# Patient Record
Sex: FEMALE | Race: WHITE | Employment: UNEMPLOYED
[De-identification: names, ages, dates, MRNs, and addresses within clinical notes are randomized per-mention and may not be internally consistent; named-entity substitution may affect disease eponyms.]

---

## 2017-05-27 ENCOUNTER — HEALTH MAINTENANCE LETTER (OUTPATIENT)
Age: 27
End: 2017-05-27

## 2019-03-04 ENCOUNTER — TRANSFERRED RECORDS (OUTPATIENT)
Dept: HEALTH INFORMATION MANAGEMENT | Facility: CLINIC | Age: 29
End: 2019-03-04
Payer: COMMERCIAL

## 2019-03-04 LAB — PAP SMEAR - HIM PATIENT REPORTED: NEGATIVE

## 2019-05-19 ENCOUNTER — HOSPITAL ENCOUNTER (EMERGENCY)
Facility: CLINIC | Age: 29
Discharge: HOME | End: 2019-05-19
Attending: FAMILY MEDICINE | Admitting: FAMILY MEDICINE
Payer: COMMERCIAL

## 2019-05-19 VITALS
DIASTOLIC BLOOD PRESSURE: 87 MMHG | HEART RATE: 73 BPM | TEMPERATURE: 97.8 F | RESPIRATION RATE: 19 BRPM | OXYGEN SATURATION: 97 % | SYSTOLIC BLOOD PRESSURE: 124 MMHG

## 2019-05-19 DIAGNOSIS — R10.9 CENTRAL ABDOMINAL PAIN: ICD-10-CM

## 2019-05-19 LAB
ALBUMIN UR-MCNC: NEGATIVE MG/DL
ANION GAP SERPL CALCULATED.3IONS-SCNC: 6 MMOL/L (ref 3–14)
APPEARANCE UR: CLEAR
BASOPHILS # BLD AUTO: 0.1 10E9/L (ref 0–0.2)
BASOPHILS NFR BLD AUTO: 0.7 %
BILIRUB UR QL STRIP: NEGATIVE
BUN SERPL-MCNC: 11 MG/DL (ref 7–30)
CALCIUM SERPL-MCNC: 8.7 MG/DL (ref 8.5–10.1)
CHLORIDE SERPL-SCNC: 106 MMOL/L (ref 94–109)
CO2 SERPL-SCNC: 29 MMOL/L (ref 20–32)
COLOR UR AUTO: NORMAL
CREAT SERPL-MCNC: 0.6 MG/DL (ref 0.52–1.04)
DIFFERENTIAL METHOD BLD: NORMAL
EOSINOPHIL # BLD AUTO: 0.5 10E9/L (ref 0–0.7)
EOSINOPHIL NFR BLD AUTO: 6 %
ERYTHROCYTE [DISTWIDTH] IN BLOOD BY AUTOMATED COUNT: 12.1 % (ref 10–15)
GFR SERPL CREATININE-BSD FRML MDRD: >90 ML/MIN/{1.73_M2}
GLUCOSE SERPL-MCNC: 87 MG/DL (ref 70–99)
GLUCOSE UR STRIP-MCNC: NEGATIVE MG/DL
HCG UR QL: NEGATIVE
HCT VFR BLD AUTO: 40.5 % (ref 35–47)
HGB BLD-MCNC: 14.1 G/DL (ref 11.7–15.7)
HGB UR QL STRIP: NEGATIVE
IMM GRANULOCYTES # BLD: 0 10E9/L (ref 0–0.4)
IMM GRANULOCYTES NFR BLD: 0.2 %
INTERNAL QC OK POCT: YES
KETONES UR STRIP-MCNC: NEGATIVE MG/DL
LEUKOCYTE ESTERASE UR QL STRIP: NEGATIVE
LYMPHOCYTES # BLD AUTO: 3 10E9/L (ref 0.8–5.3)
LYMPHOCYTES NFR BLD AUTO: 35.1 %
MCH RBC QN AUTO: 31 PG (ref 26.5–33)
MCHC RBC AUTO-ENTMCNC: 34.8 G/DL (ref 31.5–36.5)
MCV RBC AUTO: 89 FL (ref 78–100)
MONOCYTES # BLD AUTO: 0.5 10E9/L (ref 0–1.3)
MONOCYTES NFR BLD AUTO: 5.8 %
NEUTROPHILS # BLD AUTO: 4.5 10E9/L (ref 1.6–8.3)
NEUTROPHILS NFR BLD AUTO: 52.2 %
NITRATE UR QL: NEGATIVE
NRBC # BLD AUTO: 0 10*3/UL
NRBC BLD AUTO-RTO: 0 /100
PH UR STRIP: 6.5 PH (ref 5–7)
PLATELET # BLD AUTO: 239 10E9/L (ref 150–450)
POTASSIUM SERPL-SCNC: 3.7 MMOL/L (ref 3.4–5.3)
RBC # BLD AUTO: 4.55 10E12/L (ref 3.8–5.2)
RBC #/AREA URNS AUTO: 0 /HPF (ref 0–2)
SODIUM SERPL-SCNC: 141 MMOL/L (ref 133–144)
SOURCE: NORMAL
SP GR UR STRIP: 1.01 (ref 1–1.03)
SQUAMOUS #/AREA URNS AUTO: <1 /HPF (ref 0–1)
UROBILINOGEN UR STRIP-MCNC: NORMAL MG/DL (ref 0–2)
WBC # BLD AUTO: 8.6 10E9/L (ref 4–11)
WBC #/AREA URNS AUTO: <1 /HPF (ref 0–5)

## 2019-05-19 PROCEDURE — 80048 BASIC METABOLIC PNL TOTAL CA: CPT | Performed by: FAMILY MEDICINE

## 2019-05-19 PROCEDURE — 81001 URINALYSIS AUTO W/SCOPE: CPT | Performed by: FAMILY MEDICINE

## 2019-05-19 PROCEDURE — 81025 URINE PREGNANCY TEST: CPT | Performed by: FAMILY MEDICINE

## 2019-05-19 PROCEDURE — 85025 COMPLETE CBC W/AUTO DIFF WBC: CPT | Performed by: FAMILY MEDICINE

## 2019-05-19 PROCEDURE — 99284 EMERGENCY DEPT VISIT MOD MDM: CPT | Performed by: FAMILY MEDICINE

## 2019-05-19 PROCEDURE — 99283 EMERGENCY DEPT VISIT LOW MDM: CPT | Mod: Z6 | Performed by: FAMILY MEDICINE

## 2019-05-19 ASSESSMENT — ENCOUNTER SYMPTOMS
APPETITE CHANGE: 0
CONSTIPATION: 0
CHILLS: 0
DIARRHEA: 0
ABDOMINAL PAIN: 1
FEVER: 0
VOMITING: 0
NAUSEA: 0
MYALGIAS: 0
HEMATOLOGIC/LYMPHATIC NEGATIVE: 1
FATIGUE: 0
FREQUENCY: 0
DYSURIA: 0
FLANK PAIN: 0
SORE THROAT: 0

## 2019-05-19 NOTE — ED AVS SNAPSHOT
[facility], [place], Emergency Department  [address]  [facility] [address]  Phone:  [phone]  Fax:  [phone]                                    Lynnette Vicente   MRN: [id number]    Department:  [facility], Emergency Department   Date of Visit:  5/19/2019           After Visit Summary Signature Page    I have received my discharge instructions, and my questions have been answered. I have discussed any challenges I see with this plan with the nurse or doctor.    ..........................................................................................................................................  Patient/Patient Representative Signature      ..........................................................................................................................................  Patient Representative Print Name and Relationship to Patient    ..................................................               ................................................  Date                                   Time    ..........................................................................................................................................  Reviewed by Signature/Title    ...................................................              ..............................................  Date                                               Time          22EPIC Rev 08/18

## 2019-05-20 NOTE — ED PROVIDER NOTES
History     Chief Complaint   Patient presents with     Abdominal Pain     Sharp right lower quadrant pain started this morning. No nausea, or vomiting.     HPI  Lynnette Vicente is a 28 year old female who has NO medical problems other than migranes and is on no medications. She presents with 12 hours of mild periumbilical pain- more right than left. There is no fevers or chills. No nausea or vomiting. No dysuria or frequency. No hematuria. No constipation or diarrhea. She is active sexually with a mirena IUD. No bleeding or discharge. No trauma. The pt has eaten today and is mildy hungry presently    I have reviewed the Medications, Allergies, Past Medical and Surgical History, and Social History in the Epic system.  No street drugs or tobacco or significant alcohol  Review of Systems   Constitutional: Negative for appetite change, chills, fatigue and fever.   HENT: Negative for congestion and sore throat.    Cardiovascular: Negative for chest pain.   Gastrointestinal: Positive for abdominal pain. Negative for constipation, diarrhea, nausea and vomiting.   Genitourinary: Negative for dysuria, flank pain, frequency, pelvic pain, vaginal bleeding and vaginal discharge.   Musculoskeletal: Negative for myalgias.   Allergic/Immunologic: Negative for immunocompromised state.   Hematological: Negative.        Physical Exam   BP: 124/78  Pulse: 73  Heart Rate: 57  Temp: 97.9  F (36.6  C)  Resp: 17  SpO2: 97 %      Physical Exam   Constitutional: She is oriented to person, place, and time. She appears well-developed and well-nourished. No distress.   HENT:   Head: Normocephalic and atraumatic.   Neck: Normal range of motion. Neck supple.   Cardiovascular: Normal rate and regular rhythm.   Pulmonary/Chest: Effort normal.   Abdominal: Soft. She exhibits no mass. There is tenderness. There is no rebound and no guarding.   Mild tenderness periumbilical, R>L periumbilically  No guarding or rebound  Benign exam at this time  No  hs indira   Musculoskeletal: She exhibits no edema.   Neurological: She is alert and oriented to person, place, and time.   Skin: Skin is warm and dry. She is not diaphoretic.   Nursing note and vitals reviewed.      ED Course        Procedures        UPT neg  ua normal- sp gr 1.008 with no ketones  Cbc normal  Bmp normal    Labs Ordered and Resulted from Time of ED Arrival Up to the Time of Departure from the ED   HCG QUAL URINE POCT - Normal   ROUTINE UA WITH MICROSCOPIC REFLEX TO CULTURE   CBC WITH PLATELETS DIFFERENTIAL   BASIC METABOLIC PANEL            Assessments & Plan (with Medical Decision Making)   The pt has mild periumbilical pain/tenderness for 12 hour with normal labs. She has no other symptoms  The pt and I with boyfriend had long discussion. This could be an early appy.   We discussed CT scan and radiation. She would like to avoid radiation as she has had one previous ct of the brain.  After discussion discussing options - she wants to go home and return for exam if fully better  If she returns she should get a ct scan. She reports that when she had the brain scan(for headaches)- she got contrast and developed mild case of hives.   If she returns she should get the ct without contrast- I spoke with radiologist    I have reviewed the nursing notes.    I have reviewed the findings, diagnosis, plan and need for follow up with the patient.       Medication List      There are no discharge medications for this visit.         Final diagnoses:   Central abdominal pain       5/19/2019   [facility] [place], EMERGENCY DEPARTMENT     Bradford Aguayo MD  05/19/19 5810

## 2022-01-25 ENCOUNTER — OFFICE VISIT (OUTPATIENT)
Dept: FAMILY MEDICINE | Facility: CLINIC | Age: 32
End: 2022-01-25
Payer: COMMERCIAL

## 2022-01-25 VITALS
TEMPERATURE: 97.9 F | OXYGEN SATURATION: 98 % | WEIGHT: 137.3 LBS | HEART RATE: 68 BPM | DIASTOLIC BLOOD PRESSURE: 73 MMHG | HEIGHT: 63 IN | SYSTOLIC BLOOD PRESSURE: 114 MMHG | BODY MASS INDEX: 24.33 KG/M2

## 2022-01-25 DIAGNOSIS — M79.604 PAIN IN BOTH LOWER EXTREMITIES: Primary | ICD-10-CM

## 2022-01-25 DIAGNOSIS — M79.605 PAIN IN BOTH LOWER EXTREMITIES: Primary | ICD-10-CM

## 2022-01-25 LAB
ERYTHROCYTE [DISTWIDTH] IN BLOOD BY AUTOMATED COUNT: 12.5 % (ref 10–15)
ERYTHROCYTE [SEDIMENTATION RATE] IN BLOOD BY WESTERGREN METHOD: 4 MM/HR (ref 0–20)
HCT VFR BLD AUTO: 40.8 % (ref 35–47)
HGB BLD-MCNC: 14.4 G/DL (ref 11.7–15.7)
MCH RBC QN AUTO: 30.6 PG (ref 26.5–33)
MCHC RBC AUTO-ENTMCNC: 35.3 G/DL (ref 31.5–36.5)
MCV RBC AUTO: 87 FL (ref 78–100)
PLATELET # BLD AUTO: 257 10E3/UL (ref 150–450)
RBC # BLD AUTO: 4.7 10E6/UL (ref 3.8–5.2)
WBC # BLD AUTO: 8.9 10E3/UL (ref 4–11)

## 2022-01-25 PROCEDURE — 86140 C-REACTIVE PROTEIN: CPT | Performed by: FAMILY MEDICINE

## 2022-01-25 PROCEDURE — 80053 COMPREHEN METABOLIC PANEL: CPT | Performed by: FAMILY MEDICINE

## 2022-01-25 PROCEDURE — 84443 ASSAY THYROID STIM HORMONE: CPT | Performed by: FAMILY MEDICINE

## 2022-01-25 PROCEDURE — 36415 COLL VENOUS BLD VENIPUNCTURE: CPT | Performed by: FAMILY MEDICINE

## 2022-01-25 PROCEDURE — 85652 RBC SED RATE AUTOMATED: CPT | Performed by: FAMILY MEDICINE

## 2022-01-25 PROCEDURE — 85027 COMPLETE CBC AUTOMATED: CPT | Performed by: FAMILY MEDICINE

## 2022-01-25 PROCEDURE — 99203 OFFICE O/P NEW LOW 30 MIN: CPT | Performed by: FAMILY MEDICINE

## 2022-01-25 PROCEDURE — 82607 VITAMIN B-12: CPT | Performed by: FAMILY MEDICINE

## 2022-01-25 ASSESSMENT — MIFFLIN-ST. JEOR: SCORE: 1296.12

## 2022-01-25 NOTE — PROGRESS NOTES
Assessment & Plan     Pain in both lower extremities  B/l leg pains - intermittent stabbing pain that is happening more frequently   No weakness or other   Wonder about a neuropathy?  Will check some labs to look for possible cause such as hypothyroid, anemia, b12 def or diabetes.  Labs pending  Would like to have her follow-up in 2-3 weeks for reassessment as not etiology found and increasing in frequency.  - TSH with free T4 reflex; Future  - CBC with platelets; Future  - Comprehensive metabolic panel (BMP + Alb, Alk Phos, ALT, AST, Total. Bili, TP); Future  - Vitamin B12; Future  - ESR: Erythrocyte sedimentation rate; Future  - CRP, inflammation; Future            No follow-ups on file.    Annalisa IFRAH Beck [facility] is a 31 year old who presents for the following health issues     History of Present Illness       She eats 4 or more servings of fruits and vegetables daily.She consumes 0 sweetened beverage(s) daily.She exercises with enough effort to increase her heart rate 10 to 19 minutes per day.  She exercises with enough effort to increase her heart rate 3 or less days per week.   She is taking medications regularly.       Pain History:  When did you first notice your pain? - Less than 1 week   Have you seen anyone else for your pain? No  Where in your body do you have pain? Musculoskeletal problem/pain  Onset/Duration: Few weeks  Description  Location: leg - Both  Joint Swelling: no  Redness: no  Pain: YES  Warmth: no  Intensity:  moderate  Progression of Symptoms:  worsening  Accompanying signs and symptoms:   Fevers: no  Numbness/tingling/weakness: YES  History  Trauma to the area: no  Recent illness:  no  Previous similar problem: no  Previous evaluation:  no  Precipitating or alleviating factors:  Aggravating factors include: sitting and standing  Therapies tried and outcome: rest/inactivity, heat, ice and stretching    First noticed symptoms few weeks ago  "  At onset was every few ays but has progressed to multiple times a dday  The pain will last a few seconds   Associated with increase sensitivity  Will happen if standing, sitting or lying down  No skin changes or rashes  Yesterday felt tired    No swelling n legs   No rash      Only change is working from home one extra day  Also started [place] skiing and shoveling but not clearly related    Family history -   CAD and diabetes  PGM and Paunt have RA    Hx of migraines    Has IUD in place - Mirena   Was placed in 2015-16          Review of Systems   Constitutional, HEENT, cardiovascular, pulmonary, GI, , musculoskeletal, neuro, skin, endocrine and psych systems are negative, except as otherwise noted.      Objective    /73   Pulse 68   Temp 97.9  F (36.6  C)   Ht 1.599 m (5' 2.95\")   Wt 62.3 kg (137 lb 4.8 oz)   SpO2 98%   BMI 24.36 kg/m    Body mass index is 24.36 kg/m .  Physical Exam   GENERAL: healthy, alert and no distress  MS: no gross musculoskeletal defects noted, no edema  SKIN: no suspicious lesions or rashes  NEURO: Normal strength and tone, sensory exam grossly normal, mentation intact and DTR's normal and symmetric    Comprehensive back pain exam:  Tenderness over the thoracic mid back but no reproducible pain, Lower extremity strength functional and equal on both sides, Lower extremity reflexes within normal limits bilaterally and Lower extremity sensation normal and equal on both sides                "

## 2022-01-26 LAB
CRP SERPL-MCNC: <2.9 MG/L (ref 0–8)
VIT B12 SERPL-MCNC: 484 PG/ML (ref 193–986)

## 2022-01-27 LAB
ALBUMIN SERPL-MCNC: 4.3 G/DL (ref 3.4–5)
ALP SERPL-CCNC: 52 U/L (ref 40–150)
ALT SERPL W P-5'-P-CCNC: 22 U/L (ref 0–50)
ANION GAP SERPL CALCULATED.3IONS-SCNC: 9 MMOL/L (ref 3–14)
AST SERPL W P-5'-P-CCNC: 15 U/L (ref 0–45)
BILIRUB SERPL-MCNC: 0.5 MG/DL (ref 0.2–1.3)
BUN SERPL-MCNC: 9 MG/DL (ref 7–30)
CALCIUM SERPL-MCNC: 9 MG/DL (ref 8.5–10.1)
CHLORIDE BLD-SCNC: 105 MMOL/L (ref 94–109)
CO2 SERPL-SCNC: 24 MMOL/L (ref 20–32)
CREAT SERPL-MCNC: 0.63 MG/DL (ref 0.52–1.04)
GFR SERPL CREATININE-BSD FRML MDRD: >90 ML/MIN/1.73M2
GLUCOSE BLD-MCNC: 79 MG/DL (ref 70–99)
POTASSIUM BLD-SCNC: 4.6 MMOL/L (ref 3.4–5.3)
PROT SERPL-MCNC: 7.2 G/DL (ref 6.8–8.8)
SODIUM SERPL-SCNC: 138 MMOL/L (ref 133–144)
TSH SERPL DL<=0.005 MIU/L-ACNC: 2.14 MU/L (ref 0.4–4)

## 2022-01-28 NOTE — RESULT ENCOUNTER NOTE
Dear Lynnette,   Your test results are all back -   -All of your labs are normal.  Let us know if you have any questions.  -Annalisa Beck, DO

## 2022-02-09 ENCOUNTER — VIRTUAL VISIT (OUTPATIENT)
Dept: FAMILY MEDICINE | Facility: CLINIC | Age: 32
End: 2022-02-09
Payer: COMMERCIAL

## 2022-02-09 DIAGNOSIS — M79.604 PAIN IN BOTH LOWER EXTREMITIES: Primary | ICD-10-CM

## 2022-02-09 DIAGNOSIS — G43.009 MIGRAINE WITHOUT AURA AND WITHOUT STATUS MIGRAINOSUS, NOT INTRACTABLE: ICD-10-CM

## 2022-02-09 DIAGNOSIS — M79.605 PAIN IN BOTH LOWER EXTREMITIES: Primary | ICD-10-CM

## 2022-02-09 PROCEDURE — 99213 OFFICE O/P EST LOW 20 MIN: CPT | Mod: 95 | Performed by: FAMILY MEDICINE

## 2022-02-09 RX ORDER — MULTIPLE VITAMINS W/ MINERALS TAB 9MG-400MCG
1 TAB ORAL DAILY
COMMUNITY
End: 2023-04-28

## 2022-02-09 NOTE — PROGRESS NOTES
Lynnette is a 31 year old who is being evaluated via a billable video visit.      How would you like to obtain your AVS? MyChart  If the video visit is dropped, the invitation should be resent by: Text to cell phone: [phone]  Will anyone else be joining your video visit? No     Video Start Time: 12:40pm    Assessment & Plan     Pain in both lower extremities  Pt with strange sensation in b/l legs that is still going on but slightly improved  Wonder about neuromuscular vs other possible etiology  Labs were normal  Will refer to neurology for evaluation   - Adult Neurology  Referral; Future    Migraine without aura and without status migrainosus, not intractable  Hx of migraines and tension headaches  Would like to discuss with neurology as well   - Adult Neurology  Referral; Future      No follow-ups on file.    Annalisa Beck, [facility]   Lynnette is a 31 year old who presents for the following health issues     HPI     Pain History:  When did you first notice your pain? - Less than 1 week   Have you seen anyone else for your pain? Yes - Dr. Beck   Where in your body do you have pain? Musculoskeletal problem/pain  Onset/Duration: a few weeks ago   Description  Location: leg - bilateral  Joint Swelling: no  Redness: no  Pain: YES  Warmth: no  Intensity:  moderate  Progression of Symptoms:  same  Accompanying signs and symptoms:   Fevers: no  Numbness/tingling/weakness: no  History  Trauma to the area: no  Recent illness:  no  Previous similar problem: no  Previous evaluation:  YES  Precipitating or alleviating factors:  Aggravating factors include: sitting and standing  Therapies tried and outcome: rest/inactivity, heat, ice, massage and stretching    Still having strange pain in legs -   Will notice in inner thighs and lower calves - feels like someone is pushing or digging pressure into the muscle in the area.  Also noticed skin had increase sensitivity    Previously had multiple episodes a day   Still having episodes but less frequent and less severe  Currently 1-3 times a day but before was more every hours  Started taking vitamin after her last visit -   No weakness in the legs, no coordination problems  Hx of migraines -   But will also get bursts of pain out of her eye   No new headaches                 Review of Systems   Constitutional, HEENT, cardiovascular, pulmonary, gi and gu systems are negative, except as otherwise noted.      Objective           Vitals:  No vitals were obtained today due to virtual visit.    Physical Exam   GENERAL: Healthy, alert and no distress  EYES: Eyes grossly normal to inspection.  No discharge or erythema, or obvious scleral/conjunctival abnormalities.  RESP: No audible wheeze, cough, or visible cyanosis.  No visible retractions or increased work of breathing.    SKIN: Visible skin clear. No significant rash, abnormal pigmentation or lesions.  NEURO: Cranial nerves grossly intact.  Mentation and speech appropriate for age.  PSYCH: Mentation appears normal, affect normal/bright, judgement and insight intact, normal speech and appearance well-groomed.                Video-Visit Details    Type of service:  Video Visit    Video End Time:1:09 PM    Originating Location (pt. Location): Home    Distant Location (provider location):  [facility]     Platform used for Video Visit: EQUISO

## 2022-02-12 ENCOUNTER — HEALTH MAINTENANCE LETTER (OUTPATIENT)
Age: 32
End: 2022-02-12
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
## 2022-06-03 ENCOUNTER — OFFICE VISIT (OUTPATIENT)
Dept: FAMILY MEDICINE | Facility: CLINIC | Age: 32
End: 2022-06-03
Payer: COMMERCIAL

## 2022-06-03 VITALS
RESPIRATION RATE: 15 BRPM | OXYGEN SATURATION: 97 % | TEMPERATURE: 98.6 F | WEIGHT: 135.5 LBS | BODY MASS INDEX: 24.01 KG/M2 | HEART RATE: 77 BPM | HEIGHT: 63 IN | SYSTOLIC BLOOD PRESSURE: 119 MMHG | DIASTOLIC BLOOD PRESSURE: 71 MMHG

## 2022-06-03 DIAGNOSIS — Z11.59 NEED FOR HEPATITIS C SCREENING TEST: ICD-10-CM

## 2022-06-03 DIAGNOSIS — Z11.4 SCREENING FOR HIV (HUMAN IMMUNODEFICIENCY VIRUS): ICD-10-CM

## 2022-06-03 DIAGNOSIS — Z97.5 IUD (INTRAUTERINE DEVICE) IN PLACE: ICD-10-CM

## 2022-06-03 DIAGNOSIS — M24.20 LIGAMENT LAXITY: ICD-10-CM

## 2022-06-03 DIAGNOSIS — Z00.00 ROUTINE GENERAL MEDICAL EXAMINATION AT A HEALTH CARE FACILITY: Primary | ICD-10-CM

## 2022-06-03 LAB
CHOLEST SERPL-MCNC: 198 MG/DL
FASTING STATUS PATIENT QL REPORTED: YES
HDLC SERPL-MCNC: 53 MG/DL
LDLC SERPL CALC-MCNC: 130 MG/DL
NONHDLC SERPL-MCNC: 145 MG/DL
TRIGL SERPL-MCNC: 77 MG/DL

## 2022-06-03 PROCEDURE — 36415 COLL VENOUS BLD VENIPUNCTURE: CPT | Performed by: FAMILY MEDICINE

## 2022-06-03 PROCEDURE — 90471 IMMUNIZATION ADMIN: CPT | Performed by: FAMILY MEDICINE

## 2022-06-03 PROCEDURE — 90715 TDAP VACCINE 7 YRS/> IM: CPT | Performed by: FAMILY MEDICINE

## 2022-06-03 PROCEDURE — 80061 LIPID PANEL: CPT | Performed by: FAMILY MEDICINE

## 2022-06-03 PROCEDURE — 99395 PREV VISIT EST AGE 18-39: CPT | Mod: 25 | Performed by: FAMILY MEDICINE

## 2022-06-03 ASSESSMENT — ENCOUNTER SYMPTOMS
HEADACHES: 1
DIZZINESS: 0
DIARRHEA: 0
FEVER: 0
DYSURIA: 0
CHILLS: 0
SORE THROAT: 0
PARESTHESIAS: 1
HEMATOCHEZIA: 0
MYALGIAS: 0
ARTHRALGIAS: 1
NERVOUS/ANXIOUS: 1
NAUSEA: 0
PALPITATIONS: 0
HEMATURIA: 0
CONSTIPATION: 1
HEARTBURN: 0
FREQUENCY: 0
SHORTNESS OF BREATH: 0
WEAKNESS: 0
BREAST MASS: 0
EYE PAIN: 0
JOINT SWELLING: 0
ABDOMINAL PAIN: 0
COUGH: 0

## 2022-06-05 NOTE — RESULT ENCOUNTER NOTE
Dear Lynnette,   Your test results are all back -   -Cholesterol levels (LDL,HDL, Triglycerides) are normal.  LDL is just borderline so always good to keep eating healthy and exercise.  ADVISE: rechecking in 1 year.   Let us know if you have any questions.  -Annalisa Beck, DO

## 2022-07-29 NOTE — TELEPHONE ENCOUNTER
FUTURE VISIT INFORMATION      FUTURE VISIT INFORMATION:    Date: 9/2/2022    Time: 7am    [facility]  REFERRAL INFORMATION:    Referring provider:  Dr. Beck    Referring providers clinic:  [facility]     Reason for visit/diagnosis  Migraines, Pain     RECORDS REQUESTED FROM:       Clinic name Comments Records Status Imaging Status   Internal Dr. Beck-2/9/2022, 6/3/2022 Epic  No Images          [facility] Dr. Collins-8/16/2019 Care Everywhere No Images

## 2022-09-02 ENCOUNTER — PRE VISIT (OUTPATIENT)
Dept: NEUROLOGY | Facility: CLINIC | Age: 32
End: 2022-09-02

## 2022-10-10 ENCOUNTER — HEALTH MAINTENANCE LETTER (OUTPATIENT)
Age: 32
End: 2022-10-10

## 2023-01-11 ENCOUNTER — TELEPHONE (OUTPATIENT)
Dept: FAMILY MEDICINE | Facility: CLINIC | Age: 33
End: 2023-01-11

## 2023-01-11 ENCOUNTER — NURSE TRIAGE (OUTPATIENT)
Dept: FAMILY MEDICINE | Facility: CLINIC | Age: 33
End: 2023-01-11

## 2023-01-11 NOTE — TELEPHONE ENCOUNTER
Nurse Triage SBAR    Is this a 2nd Level Triage? NO    Situation: Patient called  Experiencing bilateral eye pain, worse on L side  For the past 3 months    Background: Has history of migraines  Takes ibuprofen to help    Assessment: Denies changes to vision  Consulted PCP, agrees with recommendation    Protocol Recommended Disposition:   See Within 2 Weeks In Office    Recommendation: Scheduled at eye doctor Tuesday   Neurology appt upcoming  PCP scheduled 1/24/23  Will call back or go to ER if symptoms worsen     Does the patient meet one of the following criteria for ADS visit consideration? No    Reason for Disposition    Mild eye pains are a recurrent problem    Additional Information    Negative: Followed an eye injury    Negative: Eye pain from chemical in the eye    Negative: Eye pain from foreign body in eye    Negative: Has sinus pain or pressure    Negative: Severe eye pain    Negative: Complete loss of vision in one or both eyes    Negative: Eyelids are very swollen (shut or almost) and fever    Negative: Eyelid (outer) is very red and fever    Negative: Foreign body sensation ('feels like something is in there') and irrigation didn't help    Negative: Vomiting    Negative: Ulcer or sore seen on the cornea (clear center part of the eye)    Negative: Recent eye surgery and increasing eye pain    Negative: Blurred vision and new or worsening    Negative: Patient sounds very sick or weak to the triager    Negative: Eye pain/discomfort and more than mild    Negative: Eyelids are very swollen (shut or almost) and no fever    Negative: Painful rash near eye and multiple small blisters grouped together    Negative: Pain followed bright light exposure from welding    Negative: Looking at light causes severe pain (i.e., photophobia)    Negative: Yellow or green pus occurs    Negative: Eye pain present > 24 hours    Negative: Patient wants to be seen    Negative: Mild eyelid pain is a recurrent problem and red and  crusty eyelids    Protocols used: EYE PAIN-A-[place]

## 2023-01-11 NOTE — TELEPHONE ENCOUNTER
Reason for Call:  Other appointment    Detailed comments: Patient has an appointment on January 24 as virtual appointment  with Doctor. Annalisa Beck but she wants to know if she can go in person for the appointment. Please call back.     Phone Number Patient can be reached at: Home number on file [phone]    Best Time: Any time    Can we leave a detailed message on this number? YES    Call taken on 1/11/2023 at 3:16 PM by Yaneli Marx

## 2023-01-17 ENCOUNTER — TRANSFERRED RECORDS (OUTPATIENT)
Dept: HEALTH INFORMATION MANAGEMENT | Facility: CLINIC | Age: 33
End: 2023-01-17
Payer: COMMERCIAL

## 2023-01-24 ENCOUNTER — OFFICE VISIT (OUTPATIENT)
Dept: FAMILY MEDICINE | Facility: CLINIC | Age: 33
End: 2023-01-24
Payer: COMMERCIAL

## 2023-01-24 VITALS
BODY MASS INDEX: 25.08 KG/M2 | DIASTOLIC BLOOD PRESSURE: 68 MMHG | TEMPERATURE: 97.8 F | SYSTOLIC BLOOD PRESSURE: 115 MMHG | WEIGHT: 136.3 LBS | HEIGHT: 62 IN | HEART RATE: 66 BPM | RESPIRATION RATE: 14 BRPM | OXYGEN SATURATION: 99 %

## 2023-01-24 DIAGNOSIS — F43.21 GRIEF REACTION: Primary | ICD-10-CM

## 2023-01-24 DIAGNOSIS — F32.0 CURRENT MILD EPISODE OF MAJOR DEPRESSIVE DISORDER WITHOUT PRIOR EPISODE (H): ICD-10-CM

## 2023-01-24 LAB
ALBUMIN SERPL BCG-MCNC: 4.8 G/DL (ref 3.5–5.2)
ALP SERPL-CCNC: 55 U/L (ref 35–104)
ALT SERPL W P-5'-P-CCNC: 12 U/L (ref 10–35)
ANION GAP SERPL CALCULATED.3IONS-SCNC: 13 MMOL/L (ref 7–15)
AST SERPL W P-5'-P-CCNC: 25 U/L (ref 10–35)
BILIRUB SERPL-MCNC: 0.4 MG/DL
BUN SERPL-MCNC: 13.4 MG/DL (ref 6–20)
CALCIUM SERPL-MCNC: 9.8 MG/DL (ref 8.6–10)
CHLORIDE SERPL-SCNC: 104 MMOL/L (ref 98–107)
CREAT SERPL-MCNC: 0.6 MG/DL (ref 0.51–0.95)
DEPRECATED HCO3 PLAS-SCNC: 23 MMOL/L (ref 22–29)
ERYTHROCYTE [DISTWIDTH] IN BLOOD BY AUTOMATED COUNT: 12.3 % (ref 10–15)
GFR SERPL CREATININE-BSD FRML MDRD: >90 ML/MIN/1.73M2
GLUCOSE SERPL-MCNC: 106 MG/DL (ref 70–99)
HCT VFR BLD AUTO: 40.9 % (ref 35–47)
HGB BLD-MCNC: 14.3 G/DL (ref 11.7–15.7)
MCH RBC QN AUTO: 30.7 PG (ref 26.5–33)
MCHC RBC AUTO-ENTMCNC: 35 G/DL (ref 31.5–36.5)
MCV RBC AUTO: 88 FL (ref 78–100)
PLATELET # BLD AUTO: 262 10E3/UL (ref 150–450)
POTASSIUM SERPL-SCNC: 4.2 MMOL/L (ref 3.4–5.3)
PROT SERPL-MCNC: 7.6 G/DL (ref 6.4–8.3)
RBC # BLD AUTO: 4.66 10E6/UL (ref 3.8–5.2)
SODIUM SERPL-SCNC: 140 MMOL/L (ref 136–145)
TSH SERPL DL<=0.005 MIU/L-ACNC: 2.28 UIU/ML (ref 0.3–4.2)
VIT B12 SERPL-MCNC: 685 PG/ML (ref 232–1245)
WBC # BLD AUTO: 9.3 10E3/UL (ref 4–11)

## 2023-01-24 PROCEDURE — 80053 COMPREHEN METABOLIC PANEL: CPT | Performed by: FAMILY MEDICINE

## 2023-01-24 PROCEDURE — 85027 COMPLETE CBC AUTOMATED: CPT | Performed by: FAMILY MEDICINE

## 2023-01-24 PROCEDURE — 84443 ASSAY THYROID STIM HORMONE: CPT | Performed by: FAMILY MEDICINE

## 2023-01-24 PROCEDURE — 99214 OFFICE O/P EST MOD 30 MIN: CPT | Performed by: FAMILY MEDICINE

## 2023-01-24 PROCEDURE — 36415 COLL VENOUS BLD VENIPUNCTURE: CPT | Performed by: FAMILY MEDICINE

## 2023-01-24 PROCEDURE — 82607 VITAMIN B-12: CPT | Performed by: FAMILY MEDICINE

## 2023-01-24 RX ORDER — ESCITALOPRAM OXALATE 5 MG/1
TABLET ORAL
Qty: 30 TABLET | Refills: 1 | Status: SHIPPED | OUTPATIENT
Start: 2023-01-24 | End: 2023-04-04

## 2023-01-24 ASSESSMENT — PATIENT HEALTH QUESTIONNAIRE - PHQ9
SUM OF ALL RESPONSES TO PHQ QUESTIONS 1-9: 17
5. POOR APPETITE OR OVEREATING: MORE THAN HALF THE DAYS
SUM OF ALL RESPONSES TO PHQ QUESTIONS 1-9: 17
10. IF YOU CHECKED OFF ANY PROBLEMS, HOW DIFFICULT HAVE THESE PROBLEMS MADE IT FOR YOU TO DO YOUR WORK, TAKE CARE OF THINGS AT HOME, OR GET ALONG WITH OTHER PEOPLE: VERY DIFFICULT

## 2023-01-24 ASSESSMENT — ANXIETY QUESTIONNAIRES
5. BEING SO RESTLESS THAT IT IS HARD TO SIT STILL: SEVERAL DAYS
GAD7 TOTAL SCORE: 15
IF YOU CHECKED OFF ANY PROBLEMS ON THIS QUESTIONNAIRE, HOW DIFFICULT HAVE THESE PROBLEMS MADE IT FOR YOU TO DO YOUR WORK, TAKE CARE OF THINGS AT HOME, OR GET ALONG WITH OTHER PEOPLE: VERY DIFFICULT
2. NOT BEING ABLE TO STOP OR CONTROL WORRYING: NEARLY EVERY DAY
GAD7 TOTAL SCORE: 15
7. FEELING AFRAID AS IF SOMETHING AWFUL MIGHT HAPPEN: MORE THAN HALF THE DAYS
3. WORRYING TOO MUCH ABOUT DIFFERENT THINGS: NEARLY EVERY DAY
6. BECOMING EASILY ANNOYED OR IRRITABLE: SEVERAL DAYS
1. FEELING NERVOUS, ANXIOUS, OR ON EDGE: NEARLY EVERY DAY

## 2023-01-24 ASSESSMENT — PAIN SCALES - GENERAL: PAINLEVEL: NO PAIN (0)

## 2023-01-24 NOTE — PROGRESS NOTES
"  Assessment & Plan     Grief reaction  See below  - Comprehensive metabolic panel (BMP + Alb, Alk Phos, ALT, AST, Total. Bili, TP); Future  - CBC with platelets; Future  - Vitamin B12; Future  - TSH with free T4 reflex; Future  - escitalopram (LEXAPRO) 5 MG tablet; Take 1/2 tablet for 1 week then increase to 1 tablet daily  - Comprehensive metabolic panel (BMP + Alb, Alk Phos, ALT, AST, Total. Bili, TP)  - CBC with platelets  - Vitamin B12  - TSH with free T4 reflex    Current mild episode of major depressive disorder without prior episode (H)  Worsening depression after grief with mother and friend passing away this past year  Working with therapist and self cares   Feels worse until this week  Discussed medications - options and potential side effects  Will start very low dose lexapro 5mg -   F/u 4-6 weeks   Pt will call or RTC if symptoms worsen or do not improve.   - Comprehensive metabolic panel (BMP + Alb, Alk Phos, ALT, AST, Total. Bili, TP); Future  - CBC with platelets; Future  - Vitamin B12; Future  - TSH with free T4 reflex; Future  - escitalopram (LEXAPRO) 5 MG tablet; Take 1/2 tablet for 1 week then increase to 1 tablet daily  - Comprehensive metabolic panel (BMP + Alb, Alk Phos, ALT, AST, Total. Bili, TP)  - CBC with platelets  - Vitamin B12  - TSH with free T4 reflex             BMI:   Estimated body mass index is 25.02 kg/m  as calculated from the following:    Height as of this encounter: 1.572 m (5' 1.89\").    Weight as of this encounter: 61.8 kg (136 lb 4.8 oz).       Depression Screening Follow Up    PHQ 1/24/2023   PHQ-9 Total Score 17   Q9: Thoughts of better off dead/self-harm past 2 weeks Several days   F/U: Thoughts of suicide or self-harm Yes   F/U: Self harm-plan No   F/U: Self-harm action No   F/U: Safety concerns No     Last PHQ-9 1/24/2023   1.  Little interest or pleasure in doing things 2   2.  Feeling down, depressed, or hopeless 2   3.  Trouble falling or staying asleep, or " sleeping too much 3   4.  Feeling tired or having little energy 3   5.  Poor appetite or overeating 1   6.  Feeling bad about yourself 2   7.  Trouble concentrating 2   8.  Moving slowly or restless 1   Q9: Thoughts of better off dead/self-harm past 2 weeks 1   PHQ-9 Total Score 17   In the past two weeks have you had thoughts of suicide or self harm? Yes   Do you have concerns about your personal safety or the safety of others? No   In the past 2 weeks have you thought about a plan or had intention to harm yourself? No   In the past 2 weeks have you acted on these thoughts in any way? No                Follow Up    Follow Up Actions Taken  Crisis resource information provided in the After Visit Summary  Pt denies SI - no plan     Discussed the following ways the patient can remain in a safe environment:        No follow-ups on file.    Annalisa Beck, [facility] is a 32 year old, presenting for the following health issues:  Depression, Anxiety, and Headache (Migraines associated with eye pain)      History of Present Illness       Reason for visit:  Explore anxiety medication    She eats 4 or more servings of fruits and vegetables daily.She consumes 0 sweetened beverage(s) daily.She exercises with enough effort to increase her heart rate 30 to 60 minutes per day.  She exercises with enough effort to increase her heart rate 3 or less days per week.   She is taking medications regularly.    Today's PHQ-9         PHQ-9 Total Score: 17    PHQ-9 Q9 Thoughts of better off dead/self-harm past 2 weeks :   Several days  Thoughts of suicide or self harm: (P) Yes  Self-harm Plan:   (P) No  Self-harm Action:     (P) No  Safety concerns for self or others: (P) No    How difficult have these problems made it for you to do your work, take care of things at home, or get along with other people: Very difficult            Anxiety and depression struggles entire life  No previous  "medications  Did work with therapists and have found coping techniques  Past felicitas lost mom and close friend - acute grief associated with this      Review of Systems   Constitutional, HEENT, cardiovascular, pulmonary, gi and gu systems are negative, except as otherwise noted.      Objective    Pulse 66   Temp 97.8  F (36.6  C) (Temporal)   Resp 14   Ht 1.572 m (5' 1.89\")   Wt 61.8 kg (136 lb 4.8 oz)   SpO2 99%   BMI 25.02 kg/m    Body mass index is 25.02 kg/m .  Physical Exam   GENERAL: alert and no distress  NECK: no adenopathy, no asymmetry, masses, or scars and thyroid normal to palpation  RESP: lungs clear to auscultation - no rales, rhonchi or wheezes  CV: regular rate and rhythm, normal S1 S2, no S3 or S4, no murmur, click or rub, no peripheral edema and peripheral pulses strong  PSYCH: mentation appears normal, affect normal/bright    Results for orders placed or performed in visit on 01/24/23 (from the past 24 hour(s))   CBC with platelets   Result Value Ref Range    WBC Count 9.3 4.0 - 11.0 10e3/uL    RBC Count 4.66 3.80 - 5.20 10e6/uL    Hemoglobin 14.3 11.7 - 15.7 g/dL    Hematocrit 40.9 35.0 - 47.0 %    MCV 88 78 - 100 fL    MCH 30.7 26.5 - 33.0 pg    MCHC 35.0 31.5 - 36.5 g/dL    RDW 12.3 10.0 - 15.0 %    Platelet Count 262 150 - 450 10e3/uL                 "

## 2023-01-25 NOTE — RESULT ENCOUNTER NOTE
Dear Lynnette,   Your test results are all back -   -Normal red blood cell (hgb) levels, normal white blood cell count and normal platelet levels.  -Liver and gallbladder tests (ALT,AST, Alk phos,bilirubin) are normal.  -Kidney function (GFR) is normal.  -Sodium is normal.  -Potassium is normal.  -Calcium is normal.  -Glucose is mildly elevated but you were nonfasting and this is okay..  -TSH (thyroid stimulating hormone) level is normal which indicates normal thyroid function.  B12 is normal.  Let us know if you have any questions.  -Annalisa Beck, DO

## 2023-02-03 ENCOUNTER — LAB (OUTPATIENT)
Dept: LAB | Facility: CLINIC | Age: 33
End: 2023-02-03
Payer: COMMERCIAL

## 2023-02-03 ENCOUNTER — OFFICE VISIT (OUTPATIENT)
Dept: NEUROLOGY | Facility: CLINIC | Age: 33
End: 2023-02-03
Attending: FAMILY MEDICINE
Payer: COMMERCIAL

## 2023-02-03 VITALS — DIASTOLIC BLOOD PRESSURE: 75 MMHG | OXYGEN SATURATION: 97 % | SYSTOLIC BLOOD PRESSURE: 114 MMHG | HEART RATE: 92 BPM

## 2023-02-03 DIAGNOSIS — M79.604 PAIN IN BOTH LOWER EXTREMITIES: ICD-10-CM

## 2023-02-03 DIAGNOSIS — M79.605 PAIN IN BOTH LOWER EXTREMITIES: ICD-10-CM

## 2023-02-03 DIAGNOSIS — G43.009 MIGRAINE WITHOUT AURA AND WITHOUT STATUS MIGRAINOSUS, NOT INTRACTABLE: ICD-10-CM

## 2023-02-03 LAB
CRP SERPL-MCNC: <3 MG/L
ERYTHROCYTE [SEDIMENTATION RATE] IN BLOOD BY WESTERGREN METHOD: 6 MM/HR (ref 0–20)
VIT B12 SERPL-MCNC: 716 PG/ML (ref 232–1245)

## 2023-02-03 PROCEDURE — 85652 RBC SED RATE AUTOMATED: CPT | Performed by: PATHOLOGY

## 2023-02-03 PROCEDURE — 86036 ANCA SCREEN EACH ANTIBODY: CPT | Performed by: PSYCHIATRY & NEUROLOGY

## 2023-02-03 PROCEDURE — 82306 VITAMIN D 25 HYDROXY: CPT | Performed by: PSYCHIATRY & NEUROLOGY

## 2023-02-03 PROCEDURE — 82607 VITAMIN B-12: CPT | Performed by: PSYCHIATRY & NEUROLOGY

## 2023-02-03 PROCEDURE — 86038 ANTINUCLEAR ANTIBODIES: CPT | Performed by: PSYCHIATRY & NEUROLOGY

## 2023-02-03 PROCEDURE — 99000 SPECIMEN HANDLING OFFICE-LAB: CPT | Performed by: PATHOLOGY

## 2023-02-03 PROCEDURE — 84207 ASSAY OF VITAMIN B-6: CPT | Mod: 90 | Performed by: PATHOLOGY

## 2023-02-03 PROCEDURE — 99205 OFFICE O/P NEW HI 60 MIN: CPT | Performed by: PSYCHIATRY & NEUROLOGY

## 2023-02-03 PROCEDURE — 36415 COLL VENOUS BLD VENIPUNCTURE: CPT | Performed by: PATHOLOGY

## 2023-02-03 PROCEDURE — 86140 C-REACTIVE PROTEIN: CPT | Performed by: PATHOLOGY

## 2023-02-03 ASSESSMENT — PAIN SCALES - GENERAL: PAINLEVEL: MILD PAIN (2)

## 2023-02-03 NOTE — PROGRESS NOTES
[facility] Neurology Consultation    Lynnette Vicente MRN# [id number]   Age: 32 year old YOB: 1990     Requesting physician: Annalisa Chino     Reason for Consultation: lower extremity dysesthesias, eye pain      History of Presenting Symptoms:   Lynnette Vicente is a 32 year old female who presents today for evaluation of lower extremity dysesthesias.      The patient has a pertinent medical history of migraine, and polyarthralgia (seen 12/13/2018 with rheumatology through [facility]).  During prior rheumatology visits, she reported issues of b/l antecubital fossa rash (2013), targetoid lesions like erythema migrans (2017), and slowly migrating joint pains of b/l feet, knees, wrists, hands.  The pain was flu-like (not sharp, not stabbing).  Newer lower back pain started in 2018. Serum test were unrevealing in 0897-1393 (see data).  The patient then followed with her PCP 1/25/2022 reporting lower extremity pain with allodynia, but again serum studies were normal.  She was then referred to neurology for both migraines and leg pains.    Today, the patient reports having episodes once a week in her inner thighs or upper biceps where it feels like someone is pressing their thumb into her.  This occurs more in the winter. There is no associated weakness. There is no discoloration of the skin.  The sensation can last 10-15 seconds.  There is no specific movement that makes it happen. She cannot provoke it otherwise.    The patient's primary concern is that of eye pain.  This started in 10-11/2022.  She never had issues of her eyes before. She describes having shooting pain in both eyes (L>R).  The pain is stabbing, but then progresses as pressure like sensation.  It makes her eyes feel swollen.  The pain can remain isolated to her left eye.  The eye pain then can lead to a headache/migranie (as below).  There is no visual change, but she does indicate that it may take time to focus on things.  There  is no desaturation of colors.  There isn't necessarily a strong association with eye movement and pain.  The eye pains can last up to 2 weeks of continued symptoms, and she can wake up with.  The pain is no altered by ibuprofen, sleep, body position, or food.      She has a long history of migraine, happening since puberty.  They were not linked with mensturation. She has kept a migraine journal.  She describes having muscle tension headaches, and migraines.  A migraine comes on with muscle tension either her right or left side of her neck (can be both), or stabbing pain on one side of her head (temple region) on either side.  There can develop a pressure sensation over both eyes.  She has associated photophobia, phonophobia, and dizziness (feelign off balance, woozy).  A migraine lasts a few hours to a few days (3 at most).  Triggers are many (light, sound, heat, stress).  She doesn't really consume much caffeine or alcohol. No history of smoking. No history of head trauma.  Migraines in the past occur about once to three times in a week.  She tells me that ibuprofen helps reduce the migraines. She has never taken a prophylactic or abortive.     Social History:   See above.  Works at a desk often, stares at screens often.       Medications:   Escitalopram     Physical Exam:   Vitals: /75   Pulse 92   SpO2 97%    General: Seated comfortably in no acute distress.  HEENT: Neck supple with normal range of motion. No Lhermitte sign. Optic discs sharp and vasculature normal on funduscopic exam.   Skin: No rashes  Neurologic:     Mental Status: Fully alert, attentive and oriented. Speech clear and fluent, no paraphasic errors.      Cranial Nerves: Visual fields intact. PERRL. EOMI with normal smooth pursuit. Facial sensation intact/symmetric. Facial movements symmetric. Hearing not formally tested but intact to conversation. Palate elevation symmetric, uvula midline. No dysarthria. Shoulder shrug strong  bilaterally. Tongue protrusion midline.     Motor: No tremors or other abnormal movements observed. Muscle tone normal throughout. No pronator drift. Normal/symmetric rapid finger tapping. Strength 5/5 throughout upper and lower extremities.     Deep Tendon Reflexes: 2+/symmetric throughout upper and lower extremities. No clonus. Toes downgoing bilaterally.     Sensory: Intact/symmetric to light touch, pinprick, temperature, vibration and proprioception throughout upper and lower extremities. Negative Romberg.      Coordination: Finger-nose-finger and heel-shin intact without dysmetria. Rapid alternating movements intact/symmetric with normal speed and rhythm.     Gait: Normal, steady casual gait. Able to walk on toes, heels and tandem without difficulty.         Data: Pertinent prior to visit   Imaging:  None prior    Laboratory:  1/25/2022-1/24/2023: TSH, B12, CBC, CMP, CRP, ESR    8/22/2018 - 3/20/2019: Lyme IgG, ESR, CCP junior, ALEK, HbsAg, Hep B surface, Hep C junior, Mono test, HIV, CBC, AST, ALT, TSH, Ferritin, CRP, and vitamin D (2019 normal, 2018 low).         Assessment and Plan:   Assessment:  Eye pain with headache    The patient's eye pain seems to be b/l and sharp in nature without a specific vision change, and separate from prior migraine descriptions.  The occurrence of the eye pain is happening during a period of stress in the patient's life, so it is possible that poor sleep/stress are leading to a change in migraine like issues and this is the result.  However, given the patient's history of polyarthralgia which was never really defined, age, and hypovitaminosis D, I do wonder if there is a possible alternative explanation like a autoimmune demyelinating condition or vasculitic or rheumatological condition at work here.  While her exam is reassuring, her clinical reporting could be taken as a mild optic neuritis without vision changes.  I think obtaining an MRI brain, along with a few serum studies  would help rule these conditions out and better provide guidance on whether to focus on treating these symptoms as a migraine.     Plan:  - ANCA, CRP, ESR, ALEK, Vitamin D, B12, B6   - MRI brain w/wout contrast    Follow up in Neurology clinic in 2-3 months, or should new concerns arise.    RBEECA Mendes D.O.   of Neurology    Total time today (65 min) in this patient encounter was spent on pre-charting, counseling and/or coordination of care. The patient is in agreement with this plan and has no further questions.

## 2023-02-03 NOTE — LETTER
2/3/2023       RE: Lynnette Vicente  [address]  [place] [address]     Dear Colleague,    Thank you for referring your patient, Lynnette Vicente, to the [facility] NEUROLOGY CLINIC [place] at [facility]. Please see a copy of my visit note below.    [facility] Neurology Consultation    Lynnette Vicente MRN# [id number]   Age: 32 year old YOB: 1990     Requesting physician: Annalisa Chino     Reason for Consultation: lower extremity dysesthesias, eye pain      History of Presenting Symptoms:   Lynnette Vicente is a 32 year old female who presents today for evaluation of lower extremity dysesthesias.      The patient has a pertinent medical history of migraine, and polyarthralgia (seen 12/13/2018 with rheumatology through [facility]).  During prior rheumatology visits, she reported issues of b/l antecubital fossa rash (2013), targetoid lesions like erythema migrans (2017), and slowly migrating joint pains of b/l feet, knees, wrists, hands.  The pain was flu-like (not sharp, not stabbing).  Newer lower back pain started in 2018. Serum test were unrevealing in [id number] (see data).  The patient then followed with her PCP 1/25/2022 reporting lower extremity pain with allodynia, but again serum studies were normal.  She was then referred to neurology for both migraines and leg pains.    Today, the patient reports having episodes once a week in her inner thighs or upper biceps where it feels like someone is pressing their thumb into her.  This occurs more in the winter. There is no associated weakness. There is no discoloration of the skin.  The sensation can last 10-15 seconds.  There is no specific movement that makes it happen. She cannot provoke it otherwise.    The patient's primary concern is that of eye pain.  This started in 10-11/2022.  She never had issues of her eyes before. She describes having shooting pain in both eyes  (L>R).  The pain is stabbing, but then progresses as pressure like sensation.  It makes her eyes feel swollen.  The pain can remain isolated to her left eye.  The eye pain then can lead to a headache/migranie (as below).  There is no visual change, but she does indicate that it may take time to focus on things.  There is no desaturation of colors.  There isn't necessarily a strong association with eye movement and pain.  The eye pains can last up to 2 weeks of continued symptoms, and she can wake up with.  The pain is no altered by ibuprofen, sleep, body position, or food.      She has a long history of migraine, happening since puberty.  They were not linked with mensturation. She has kept a migraine journal.  She describes having muscle tension headaches, and migraines.  A migraine comes on with muscle tension either her right or left side of her neck (can be both), or stabbing pain on one side of her head (temple region) on either side.  There can develop a pressure sensation over both eyes.  She has associated photophobia, phonophobia, and dizziness (feelign off balance, woozy).  A migraine lasts a few hours to a few days (3 at most).  Triggers are many (light, sound, heat, stress).  She doesn't really consume much caffeine or alcohol. No history of smoking. No history of head trauma.  Migraines in the past occur about once to three times in a week.  She tells me that ibuprofen helps reduce the migraines. She has never taken a prophylactic or abortive.     Social History:   See above.  Works at a desk often, stares at screens often.       Medications:   Escitalopram     Physical Exam:   Vitals: /75   Pulse 92   SpO2 97%    General: Seated comfortably in no acute distress.  HEENT: Neck supple with normal range of motion. No Lhermitte sign. Optic discs sharp and vasculature normal on funduscopic exam.   Skin: No rashes  Neurologic:     Mental Status: Fully alert, attentive and oriented. Speech clear and  fluent, no paraphasic errors.      Cranial Nerves: Visual fields intact. PERRL. EOMI with normal smooth pursuit. Facial sensation intact/symmetric. Facial movements symmetric. Hearing not formally tested but intact to conversation. Palate elevation symmetric, uvula midline. No dysarthria. Shoulder shrug strong bilaterally. Tongue protrusion midline.     Motor: No tremors or other abnormal movements observed. Muscle tone normal throughout. No pronator drift. Normal/symmetric rapid finger tapping. Strength 5/5 throughout upper and lower extremities.     Deep Tendon Reflexes: 2+/symmetric throughout upper and lower extremities. No clonus. Toes downgoing bilaterally.     Sensory: Intact/symmetric to light touch, pinprick, temperature, vibration and proprioception throughout upper and lower extremities. Negative Romberg.      Coordination: Finger-nose-finger and heel-shin intact without dysmetria. Rapid alternating movements intact/symmetric with normal speed and rhythm.     Gait: Normal, steady casual gait. Able to walk on toes, heels and tandem without difficulty.         Data: Pertinent prior to visit   Imaging:  None prior    Laboratory:  1/25/2022-1/24/2023: TSH, B12, CBC, CMP, CRP, ESR    8/22/2018 - 3/20/2019: Lyme IgG, ESR, CCP junior, ALEK, HbsAg, Hep B surface, Hep C junior, Mono test, HIV, CBC, AST, ALT, TSH, Ferritin, CRP, and vitamin D (2019 normal, 2018 low).         Assessment and Plan:   Assessment:  Eye pain with headache    The patient's eye pain seems to be b/l and sharp in nature without a specific vision change, and separate from prior migraine descriptions.  The occurrence of the eye pain is happening during a period of stress in the patient's life, so it is possible that poor sleep/stress are leading to a change in migraine like issues and this is the result.  However, given the patient's history of polyarthralgia which was never really defined, age, and hypovitaminosis D, I do wonder if there is a  possible alternative explanation like a autoimmune demyelinating condition or vasculitic or rheumatological condition at work here.  While her exam is reassuring, her clinical reporting could be taken as a mild optic neuritis without vision changes.  I think obtaining an MRI brain, along with a few serum studies would help rule these conditions out and better provide guidance on whether to focus on treating these symptoms as a migraine.     Plan:  - ANCA, CRP, ESR, ALEK, Vitamin D, B12, B6   - MRI brain w/wout contrast    Follow up in Neurology clinic in 2-3 months, or should new concerns arise.    REBECA Mendes D.O.   of Neurology    Total time today (65 min) in this patient encounter was spent on pre-charting, counseling and/or coordination of care. The patient is in agreement with this plan and has no further questions.

## 2023-02-03 NOTE — PATIENT INSTRUCTIONS
Your exam is reassuring at this time, but your symptoms paint the picture which could include a wide variety of syndromes/conditions.  The most unlikely but severe issue would be that of an central nervous system autoimmune disease, and this can be better evaluated with imaging and some serum tests.  The most likely etiology is migraine, which if the imaging is negative and serum testing is negative, we could treat with certain medications.

## 2023-02-06 LAB
ANA SER QL IF: NEGATIVE
ANCA AB PATTERN SER IF-IMP: NORMAL
C-ANCA TITR SER IF: NORMAL {TITER}
DEPRECATED CALCIDIOL+CALCIFEROL SERPL-MC: 45 UG/L (ref 20–75)
PYRIDOXAL PHOS SERPL-SCNC: 107.6 NMOL/L

## 2023-02-07 ENCOUNTER — ANCILLARY PROCEDURE (OUTPATIENT)
Dept: MRI IMAGING | Facility: CLINIC | Age: 33
End: 2023-02-07
Attending: PSYCHIATRY & NEUROLOGY
Payer: COMMERCIAL

## 2023-02-07 DIAGNOSIS — M79.605 PAIN IN BOTH LOWER EXTREMITIES: ICD-10-CM

## 2023-02-07 DIAGNOSIS — M79.604 PAIN IN BOTH LOWER EXTREMITIES: ICD-10-CM

## 2023-02-07 DIAGNOSIS — G43.009 MIGRAINE WITHOUT AURA AND WITHOUT STATUS MIGRAINOSUS, NOT INTRACTABLE: ICD-10-CM

## 2023-02-07 PROCEDURE — 70553 MRI BRAIN STEM W/O & W/DYE: CPT | Mod: GC | Performed by: RADIOLOGY

## 2023-02-07 PROCEDURE — A9585 GADOBUTROL INJECTION: HCPCS | Performed by: RADIOLOGY

## 2023-02-07 RX ORDER — GADOBUTROL 604.72 MG/ML
7.5 INJECTION INTRAVENOUS ONCE
Status: COMPLETED | OUTPATIENT
Start: 2023-02-07 | End: 2023-02-07

## 2023-02-07 RX ADMIN — GADOBUTROL 6 ML: 604.72 INJECTION INTRAVENOUS at 07:55

## 2023-02-10 ENCOUNTER — TELEPHONE (OUTPATIENT)
Dept: DERMATOLOGY | Facility: CLINIC | Age: 33
End: 2023-02-10
Payer: COMMERCIAL

## 2023-04-02 ENCOUNTER — MYC MEDICAL ADVICE (OUTPATIENT)
Dept: FAMILY MEDICINE | Facility: CLINIC | Age: 33
End: 2023-04-02
Payer: COMMERCIAL

## 2023-04-02 DIAGNOSIS — F43.21 GRIEF REACTION: ICD-10-CM

## 2023-04-02 DIAGNOSIS — F32.0 CURRENT MILD EPISODE OF MAJOR DEPRESSIVE DISORDER WITHOUT PRIOR EPISODE (H): ICD-10-CM

## 2023-04-04 ENCOUNTER — VIRTUAL VISIT (OUTPATIENT)
Dept: NEUROLOGY | Facility: CLINIC | Age: 33
End: 2023-04-04
Payer: COMMERCIAL

## 2023-04-04 DIAGNOSIS — G43.009 MIGRAINE WITHOUT AURA AND WITHOUT STATUS MIGRAINOSUS, NOT INTRACTABLE: Primary | ICD-10-CM

## 2023-04-04 PROCEDURE — 99214 OFFICE O/P EST MOD 30 MIN: CPT | Mod: VID | Performed by: PSYCHIATRY & NEUROLOGY

## 2023-04-04 RX ORDER — SUMATRIPTAN 50 MG/1
50 TABLET, FILM COATED ORAL
Qty: 9 TABLET | Refills: 4 | Status: SHIPPED | OUTPATIENT
Start: 2023-04-04 | End: 2023-11-22 | Stop reason: SINTOL

## 2023-04-04 RX ORDER — ESCITALOPRAM OXALATE 5 MG/1
TABLET ORAL
Qty: 30 TABLET | Refills: 0 | Status: SHIPPED | OUTPATIENT
Start: 2023-04-04 | End: 2023-04-28

## 2023-04-04 NOTE — PROGRESS NOTES
Lefty is a 32 year old who is being evaluated via a billable video visit.      How would you like to obtain your AVS? Mychart  If the video visit is dropped, the invitation should be resent by: [phone]  [email or web address]        Video-Visit Details    Type of service:  Video Visit     Originating Location (pt. Location): Home    Distant Location (provider location):  On-site  Platform used for Video Visit: Nisha

## 2023-04-04 NOTE — TELEPHONE ENCOUNTER
Spoke to patient  States she is feeling much better  No negative side effects from Citalopram  Scheduled for sooner appointment per last OV note  Patient will run out of medication prior  Short term refill provided    Prescription approved per [facility] Refill Protocol.  Future Appointments 4/4/2023 - 10/1/2023      Date Visit Type Length Department Provider     4/4/2023 11:30 AM VIDEO VISIT RETURN 30 min [facility] NE[facility] Deandre Mendes DO    Location Instructions:     Located in the [facility] at [address] [place] [address]. For parking options, enter the [facility] /arrival plaza from [facility] and attendants can assist you based on your needs.  parking is available for those with limited mobility M-F from 7 a.m. to 5 p.m. Due to short staffing, we are unable to offer  to all patients/visitors. Visit [email or web address] for more details.  Self-parking:&nbsp;  West Lot: Located across from the main entrance, this is a convenient option for patients. Enter on [address]. Parking attendants available most hours to assist.&nbsp;     [facility] Ramp: Enter at the [facility] entrance (one block north of the [facility] main entrance). Do not enter the ramp from [facility] - this entrance is not staffed and is further from the [facility] main entrance.              4/14/2023  3:00 PM NEW 30 min [facility] DERMATOLOGY Aletha Mclaughlin PA-C    Location Instructions:     Located in the [facility] at [address] [place] [address]. For parking options, enter the [facility] /arrival plaza from [facility] and attendants can assist you based on your needs.  parking is available for those with limited mobility M-F from 7 a.m. to 5 p.m. Due to short staffing, we are unable to offer  to all patients/visitors. Visit [email or web address] for more details.  Self-parking:&nbsp;  West Lot: Located across from the main entrance, this is a convenient option for patients.  Enter on [address]. Parking attendants available most hours to assist.&nbsp;     [place] [address] Ramp: Enter at the [facility] entrance (one block north of the [facility] main entrance). Do not enter the ramp from [facility] - this entrance is not staffed and is further from the [facility] main entrance.              4/28/2023 12:00 PM OFFICE VISIT 30 min [facility] Annalisa Beck DO    Location Instructions:     [facility] is in [address] of the [facility] at [address] in [place]. The building is at the intersection with [facility] and along [facility]. This is the large, blue, glass building with a sculpture on the roof; please note there is no [place] signage on the exterior. Free lot parking is available.              6/13/2023 12:00 PM OFFICE VISIT 30 min [facility] Annalisa Beck DO    Location Instructions:     [facility] is in [address] of the [facility] at [address] in [place]. The building is at the intersection with [facility] and along [facility]. This is the large, blue, glass building with a sculpture on the roof; please note there is no [place] signage on the exterior. Free lot parking is available.                   Yen REY RN

## 2023-04-04 NOTE — PATIENT INSTRUCTIONS
- I would like to see you again in 6 months to talk about repeating your brain imaging  - For acute treatment of mild headache, continue with ibuprofen 200 mg as needed, not to exceed more than 14 days/month to avoid medication overuse.     -For acute treatment of moderate to severe headache, 50 mg sumatriptan at the onset of headache, with a repeat dose in 2 hours if needed.  This should not exceed more than 9 days/month to avoid medication overuse.

## 2023-04-04 NOTE — PROGRESS NOTES
[facility] Neurology Follow Up Visit    Lynnette Vicente MRN# [id number]   Age: 32 year old YOB: 1990     Brief history of symptoms: The patient was initially seen in neurologic consultation on 2/3/2023 for evaluation of dysesthesias and eye pain. Please see the comprehensive neurologic consultation notes from those dates in the Epic records for details.     At our visit, the patient's primary focus was that of eye pain and the overall impression was that this a separate pain from her prior migraines and likely influenced by stress/poor sleep.  To better define if there was a condition like optic neuritis, or ongoing vasculitic process, serum studies as well as imaging was to be done.    Interval history:   - MRI brain 2/7/2023 did not show evidence of optic neuritis, demyelinating lesions, tumor, or infarction upon review today and at the time of imaging.  - Serum studies 2/3/2023 were normal (B6, Vitamin D, ALEK, ANCA, B12, ESR, CRP)    Today, the patient indicates that her eye pain is mostly gone. She attributes this to getting different glasses and starting lexapro.  She wants to update that she may have 1 migraine a week on average but can have up to 3 in a week.  For now, she has been taking ibuprofen which helps trino or help reduce the migraines. She doesn't take more than 6 pills of 200 mg in one week.  She hasn't taken triptan therapies for her migraines. She is not interested in daily medications at this time.      Physical Exam:   General: Seated comfortably in no acute distress.  HEENT: Neck supple with normal range of motion.   Neurologic:     Mental Status: Fully alert, attentive and oriented. Speech clear and fluent, no paraphasic errors.     Cranial Nerves: EOMI with normal smooth pursuit. Facial movements symmetric. Hearing not formally tested but intact to conversation.  No dysarthria.     Motor: No tremors or other abnormal movements observed.          Assessment and Plan:    Assessment:  Migraine w/out aura    The patient's eye pain is resolved, and in my opinion is not likely related to her arachnoid cyst or her mucous retention cyst on the left side.  Given her use of new glassed, and an antidepressant occurred around the time of her resolution of her eye pain, it may be that the eye pain was a secondary response to issues of vision or issues of depression.  Her migraines still persist, and she is using ibuprofen for control at this time. I think she is nearing the maximum use of ibuprofen I would recommend, so I would like her to consider an abortive medication like sumatriptan for better control and to help her avoid medication overuse headaches.  The patient was agreeable to trying the medication, but will fill the script if she chooses once she reads over a medication information list provided in her after visit summary.      Plan:  - Repeat Head CT w/out contrast in 6 months if needed  - For acute treatment of mild headache, continue with ibuprofen 200 mg as needed, not to exceed more than 14 days/month to avoid medication overuse.     -For acute treatment of moderate to severe headache, 50 mg sumatriptan at the onset of headache, with a repeat dose in 2 hours if needed.  This should not exceed more than 9 days/month to avoid medication overuse.      Follow up in Neurology clinic in 6 months, or earlier as needed should new concerns arise.    REBECA Mendes D.O.   of Neurology    Total time today (39 min) in this patient encounter was spent on pre-charting, counseling and/or coordination of care.

## 2023-04-04 NOTE — LETTER
4/4/2023       RE: Lynnette Vicente  [address]  [facility] [address]     Dear Colleague,    Thank you for referring your patient, Lynnette Vicente, to the [facility] NEUROLOGY CLINIC [place] at [facility]. Please see a copy of my visit note below.    [facility] Neurology Follow Up Visit    Lynnette Vicente MRN# [id number]   Age: 32 year old YOB: 1990     Brief history of symptoms: The patient was initially seen in neurologic consultation on 2/3/2023 for evaluation of dysesthesias and eye pain. Please see the comprehensive neurologic consultation notes from those dates in the Epic records for details.     At our visit, the patient's primary focus was that of eye pain and the overall impression was that this a separate pain from her prior migraines and likely influenced by stress/poor sleep.  To better define if there was a condition like optic neuritis, or ongoing vasculitic process, serum studies as well as imaging was to be done.    Interval history:   - MRI brain 2/7/2023 did not show evidence of optic neuritis, demyelinating lesions, tumor, or infarction upon review today and at the time of imaging.  - Serum studies 2/3/2023 were normal (B6, Vitamin D, ALEK, ANCA, B12, ESR, CRP)    Today, the patient indicates that her eye pain is mostly gone. She attributes this to getting different glasses and starting lexapro.  She wants to update that she may have 1 migraine a week on average but can have up to 3 in a week.  For now, she has been taking ibuprofen which helps trino or help reduce the migraines. She doesn't take more than 6 pills of 200 mg in one week.  She hasn't taken triptan therapies for her migraines. She is not interested in daily medications at this time.      Physical Exam:   General: Seated comfortably in no acute distress.  HEENT: Neck supple with normal range of motion.   Neurologic:     Mental Status: Fully alert, attentive and  oriented. Speech clear and fluent, no paraphasic errors.     Cranial Nerves: EOMI with normal smooth pursuit. Facial movements symmetric. Hearing not formally tested but intact to conversation.  No dysarthria.     Motor: No tremors or other abnormal movements observed.          Assessment and Plan:   Assessment:  Migraine w/out aura    The patient's eye pain is resolved, and in my opinion is not likely related to her arachnoid cyst or her mucous retention cyst on the left side.  Given her use of new glassed, and an antidepressant occurred around the time of her resolution of her eye pain, it may be that the eye pain was a secondary response to issues of vision or issues of depression.  Her migraines still persist, and she is using ibuprofen for control at this time. I think she is nearing the maximum use of ibuprofen I would recommend, so I would like her to consider an abortive medication like sumatriptan for better control and to help her avoid medication overuse headaches.  The patient was agreeable to trying the medication, but will fill the script if she chooses once she reads over a medication information list provided in her after visit summary.      Plan:  - Repeat Head CT w/out contrast in 6 months if needed  - For acute treatment of mild headache, continue with ibuprofen 200 mg as needed, not to exceed more than 14 days/month to avoid medication overuse.     -For acute treatment of moderate to severe headache, 50 mg sumatriptan at the onset of headache, with a repeat dose in 2 hours if needed.  This should not exceed more than 9 days/month to avoid medication overuse.      Follow up in Neurology clinic in 6 months, or earlier as needed should new concerns arise.    REBECA Mendes D.O.   of Neurology    Total time today (39 min) in this patient encounter was spent on pre-charting, counseling and/or coordination of care.

## 2023-04-09 ENCOUNTER — OFFICE VISIT (OUTPATIENT)
Dept: URGENT CARE | Facility: URGENT CARE | Age: 33
End: 2023-04-09
Payer: COMMERCIAL

## 2023-04-09 ENCOUNTER — ANCILLARY PROCEDURE (OUTPATIENT)
Dept: GENERAL RADIOLOGY | Facility: CLINIC | Age: 33
End: 2023-04-09
Attending: FAMILY MEDICINE
Payer: COMMERCIAL

## 2023-04-09 VITALS
DIASTOLIC BLOOD PRESSURE: 76 MMHG | OXYGEN SATURATION: 95 % | SYSTOLIC BLOOD PRESSURE: 114 MMHG | RESPIRATION RATE: 14 BRPM | HEART RATE: 64 BPM | TEMPERATURE: 98.2 F | WEIGHT: 136 LBS | BODY MASS INDEX: 24.96 KG/M2

## 2023-04-09 DIAGNOSIS — R05.9 COUGH, UNSPECIFIED TYPE: ICD-10-CM

## 2023-04-09 DIAGNOSIS — J22 LOWER RESPIRATORY INFECTION: Primary | ICD-10-CM

## 2023-04-09 PROCEDURE — 71046 X-RAY EXAM CHEST 2 VIEWS: CPT | Mod: TC | Performed by: RADIOLOGY

## 2023-04-09 PROCEDURE — 99214 OFFICE O/P EST MOD 30 MIN: CPT | Performed by: FAMILY MEDICINE

## 2023-04-09 RX ORDER — BENZONATATE 100 MG/1
100 CAPSULE ORAL 3 TIMES DAILY PRN
Qty: 30 CAPSULE | Refills: 0 | Status: SHIPPED | OUTPATIENT
Start: 2023-04-09 | End: 2023-04-28

## 2023-04-09 RX ORDER — AZITHROMYCIN 250 MG/1
TABLET, FILM COATED ORAL
Qty: 6 TABLET | Refills: 0 | Status: SHIPPED | OUTPATIENT
Start: 2023-04-09 | End: 2023-04-14

## 2023-04-09 ASSESSMENT — PAIN SCALES - GENERAL: PAINLEVEL: SEVERE PAIN (6)

## 2023-04-09 NOTE — PROGRESS NOTES
Assessment & Plan     Lower respiratory infection  Differentials discussed in detail including acute bronchitis.  Wells score 0.  Chest x-ray findings reviewed independently which showed no infiltrate, pleural effusion, pneumothorax or other significant abnormality.  Azithromycin and Tessalon prescribed, common side effects discussed.  Recommended well hydration, warm fluids, steam inhalation, humidifier use and to follow-up if symptoms persist or worsen.  Patient understood and in agreement with above plan.  All questions answered.  - azithromycin (ZITHROMAX) 250 MG tablet; Take 2 tablets (500 mg) by mouth daily for 1 day, THEN 1 tablet (250 mg) daily for 4 days.  - benzonatate (TESSALON) 100 MG capsule; Take 1 capsule (100 mg) by mouth 3 times daily as needed for cough    Cough, unspecified type  - XR Chest 2 Views; Future      Vinnie Monroy MD  [facility] WILVER Church is a 32 year old, presenting for the following health issues:  Cough (Sx since 3/30/23./2 Negative covid tests.   Cough is worse in the am, and to take a full breath in/out)         View : No data to display.              HPI     Concern -   Onset: 10 days   Description: cough, congestion, sob and chest wall pain with cough  Intensity: moderate  Progression of Symptoms:  worsening  Accompanying Signs & Symptoms: no fever, chills, wheezing or other relevant systemic symptoms  Therapies tried and outcome: OTC cold meds  Covid 19 test x2 negative       Review of Systems   Constitutional, HEENT, cardiovascular, pulmonary, gi and gu systems are negative, except as otherwise noted.      Objective    /76   Pulse 64   Temp 98.2  F (36.8  C) (Tympanic)   Resp 14   Wt 61.7 kg (136 lb)   SpO2 95%   BMI 24.96 kg/m    Body mass index is 24.96 kg/m .  Physical Exam   GENERAL: healthy, alert and no distress, hoarse voice  EYES: Eyes grossly normal to inspection, PERRL and conjunctivae and sclerae  normal  HENT: normal cephalic/atraumatic, ear canals and TM's normal, nose and mouth without ulcers or lesions, oropharynx clear and oral mucous membranes moist  NECK: no adenopathy, no asymmetry, masses, or scars and thyroid normal to palpation  RESP: lungs clear to auscultation - no rales, rhonchi or wheezes  CV: regular rate and rhythm, normal S1 S2, no S3 or S4, no murmur, click or rub, no peripheral edema and peripheral pulses strong  ABDOMEN: soft, nontender, no hepatosplenomegaly, no masses and bowel sounds normal  MS: no gross musculoskeletal defects noted, no edema  NEURO: Normal strength and tone, mentation intact and speech normal  PSYCH: mentation appears normal, affect normal/bright

## 2023-04-14 ENCOUNTER — OFFICE VISIT (OUTPATIENT)
Dept: DERMATOLOGY | Facility: CLINIC | Age: 33
End: 2023-04-14
Payer: COMMERCIAL

## 2023-04-14 DIAGNOSIS — D18.01 CHERRY ANGIOMA: ICD-10-CM

## 2023-04-14 DIAGNOSIS — L57.0 ACTINIC KERATOSIS: Primary | ICD-10-CM

## 2023-04-14 DIAGNOSIS — D22.9 MULTIPLE BENIGN NEVI: ICD-10-CM

## 2023-04-14 DIAGNOSIS — L82.1 SEBORRHEIC KERATOSES: ICD-10-CM

## 2023-04-14 DIAGNOSIS — L81.4 LENTIGINES: ICD-10-CM

## 2023-04-14 PROCEDURE — 99203 OFFICE O/P NEW LOW 30 MIN: CPT | Mod: 25 | Performed by: PHYSICIAN ASSISTANT

## 2023-04-14 PROCEDURE — 17000 DESTRUCT PREMALG LESION: CPT | Performed by: PHYSICIAN ASSISTANT

## 2023-04-14 ASSESSMENT — PAIN SCALES - GENERAL: PAINLEVEL: NO PAIN (0)

## 2023-04-14 NOTE — LETTER
4/14/2023       RE: Lynnette Vicente  [address]  [facility] [address]     Dear Colleague,    Thank you for referring your patient, Lynnette Vicente, to the [facility] DERMATOLOGY CLINIC [place] at [facility]. Please see a copy of my visit note below.    [facility] Dermatology Note  Encounter Date: Apr 14, 2023  Office Visit      Dermatology Problem List:  1. AK - L nasal dorsum s/p cryo 4/14/23    Social: Hx blistering sunburns on face as teenager. 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   ____________________________________________    Assessment & Plan:  # Actinic keratosis x1 - L nasal dorsum  - edu on etiology  - Sunscreen: Apply 20 minutes prior to going outdoors and reapply every two hours, when wet or sweating. We recommend using an SPF 30 or higher, and to use one that is water resistant.     - Cryotherapy performed today (see procedure note(s) below).     # Benign findings: multiple benign nevi, lentigines, cherry angiomas, SKs  - edu on benign etiology  - Signs and Symptoms of non-melanoma skin cancer and ABCDEs of melanoma reviewed with patient. Patient encouraged to perform monthly self skin exams and educated on how to perform them. UV precautions reviewed with patient. Patient was asked about new or changing moles/lesions on body.   - Sunscreen: Apply 20 minutes prior to going outdoors and reapply every two hours, when wet or sweating. We recommend using an SPF 30 or higher, and to use one that is water resistant.     - RTC for changes    Procedures Performed:   - Cryotherapy procedure note, location(s): L nasal dorsum. After verbal consent and discussion of risks and benefits including, but not limited to, dyspigmentation/scar, blister, and pain, 1 lesion(s) was(were) treated with 1-2 mm freeze border for 1-2 cycles with liquid nitrogen. Post cryotherapy instructions were  provided.     Follow-up: 1 year(s) in-person, or earlier for new or changing lesions    Staff:     All risks, benefits and alternatives were discussed with patient.  Patient is in agreement and understands the assessment and plan.  All questions were answered.    Aletha Mclaughlin PA-C, MPAS  [facility] [place]: Phone: [phone], Fax: [phone]  [facility]: Phone: [phone],  Fax: [phone]  [facility]: Phone: [phone], Fax: [phone]  ____________________________________________    CC: Skin Check (Areas of concern include possible pre-cancerous spots on face, spots on both shoulders, and spots on lower and upper back  )      HPI:  Ms. Lynnette Vicente is a 32 year old female who presents today as a new patient for FBSE. Few spots of concern on the face, shoulders and back. PCP referred her for a spot of concern on her nose which was thought to be possibly a precancerous lesion. She reports it keeps regrowing and then shedding and coming back. Has not treated the nose. Hx blistering sun burns. No personal or fhx melanoma.     Patient is otherwise feeling well, without additional concerns.    Labs:  none    Physical Exam:  Vitals: There were no vitals taken for this visit.  SKIN: Full skin, which includes the head/face, both arms, chest, back, abdomen,both legs, genitalia and/or groin buttocks, digits and/or nails, was examined.   - There is an erythematous macule with overyling adherent scale on the L nasal dorsum x 1.  - Martínez's skin type II, <100 nevi  - There are dome shaped bright red papules on the trunk.   - Multiple regular brown pigmented macules and papules are identified on the trunk and extremities.   - Scattered brown macules on sun exposed areas.  - There are waxy stuck on tan to brown papules on the trunk.   - No other lesions of concern on areas examined.      Medications:  Current Outpatient Medications   Medication    azithromycin (ZITHROMAX) 250 MG tablet    benzonatate (TESSALON) 100 MG capsule    escitalopram (LEXAPRO) 5 MG tablet    levonorgestrel (MIRENA) 20 MCG/24HR IUD    multivitamin w/minerals (THERA-VIT-M) tablet    SUMAtriptan (IMITREX) 50 MG tablet     No current facility-administered medications for this visit.      Past Medical/Surgical History:   Patient Active Problem List   Diagnosis    Hemorrhage of rectum and anus    Migraine without aura and without status migrainosus, not intractable    IUD (intrauterine device) in place    Ligament laxity     Past Medical History:   Diagnosis Date    Migraines     Rectal bleeding

## 2023-04-14 NOTE — NURSING NOTE
Chief Complaint   Patient presents with     Skin Check     Areas of concern include possible pre-cancerous spots on face, spots on both shoulders, and spots on lower and upper back       Cecilio Sandra, EMT

## 2023-04-14 NOTE — PROGRESS NOTES
[facility] Dermatology Note  Encounter Date: Apr 14, 2023  Office Visit      Dermatology Problem List:  1. AK - L nasal dorsum s/p cryo 4/14/23    Social: Hx blistering sunburns on face as teenager. 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   ____________________________________________    Assessment & Plan:  # Actinic keratosis x1 - L nasal dorsum  - edu on etiology  - Sunscreen: Apply 20 minutes prior to going outdoors and reapply every two hours, when wet or sweating. We recommend using an SPF 30 or higher, and to use one that is water resistant.     - Cryotherapy performed today (see procedure note(s) below).     # Benign findings: multiple benign nevi, lentigines, cherry angiomas, SKs  - edu on benign etiology  - Signs and Symptoms of non-melanoma skin cancer and ABCDEs of melanoma reviewed with patient. Patient encouraged to perform monthly self skin exams and educated on how to perform them. UV precautions reviewed with patient. Patient was asked about new or changing moles/lesions on body.   - Sunscreen: Apply 20 minutes prior to going outdoors and reapply every two hours, when wet or sweating. We recommend using an SPF 30 or higher, and to use one that is water resistant.     - RTC for changes    Procedures Performed:   - Cryotherapy procedure note, location(s): L nasal dorsum. After verbal consent and discussion of risks and benefits including, but not limited to, dyspigmentation/scar, blister, and pain, 1 lesion(s) was(were) treated with 1-2 mm freeze border for 1-2 cycles with liquid nitrogen. Post cryotherapy instructions were provided.     Follow-up: 1 year(s) in-person, or earlier for new or changing lesions    Staff:     All risks, benefits and alternatives were discussed with patient.  Patient is in agreement and understands the assessment and plan.  All questions were answered.    Aletha Mclaughlin PA-C, MPAS  [facility]  [facility]: Phone: [phone], Fax: [phone]  [facility]: Phone: [phone],  Fax: [phone]  [facility] [place] [facility]: Phone: [phone], Fax: [phone]  ____________________________________________    CC: Skin Check (Areas of concern include possible pre-cancerous spots on face, spots on both shoulders, and spots on lower and upper back  )      HPI:  Ms. Lynnette Vicente is a 32 year old female who presents today as a new patient for FBSE. Few spots of concern on the face, shoulders and back. PCP referred her for a spot of concern on her nose which was thought to be possibly a precancerous lesion. She reports it keeps regrowing and then shedding and coming back. Has not treated the nose. Hx blistering sun burns. No personal or fhx melanoma.     Patient is otherwise feeling well, without additional concerns.    Labs:  none    Physical Exam:  Vitals: There were no vitals taken for this visit.  SKIN: Full skin, which includes the head/face, both arms, chest, back, abdomen,both legs, genitalia and/or groin buttocks, digits and/or nails, was examined.   - There is an erythematous macule with overyling adherent scale on the L nasal dorsum x 1.  - Martínez's skin type II, <100 nevi  - There are dome shaped bright red papules on the trunk.   - Multiple regular brown pigmented macules and papules are identified on the trunk and extremities.   - Scattered brown macules on sun exposed areas.  - There are waxy stuck on tan to brown papules on the trunk.   - No other lesions of concern on areas examined.     Medications:  Current Outpatient Medications   Medication     azithromycin (ZITHROMAX) 250 MG tablet     benzonatate (TESSALON) 100 MG capsule     escitalopram (LEXAPRO) 5 MG tablet     levonorgestrel (MIRENA) 20 MCG/24HR IUD     multivitamin w/minerals (THERA-VIT-M) tablet     SUMAtriptan (IMITREX) 50 MG tablet     No current  facility-administered medications for this visit.      Past Medical/Surgical History:   Patient Active Problem List   Diagnosis     Hemorrhage of rectum and anus     Migraine without aura and without status migrainosus, not intractable     IUD (intrauterine device) in place     Ligament laxity     Past Medical History:   Diagnosis Date     Migraines      Rectal bleeding

## 2023-04-28 ENCOUNTER — VIRTUAL VISIT (OUTPATIENT)
Dept: FAMILY MEDICINE | Facility: CLINIC | Age: 33
End: 2023-04-28
Payer: COMMERCIAL

## 2023-04-28 DIAGNOSIS — F43.21 GRIEF REACTION: ICD-10-CM

## 2023-04-28 DIAGNOSIS — F32.0 CURRENT MILD EPISODE OF MAJOR DEPRESSIVE DISORDER WITHOUT PRIOR EPISODE (H): ICD-10-CM

## 2023-04-28 PROBLEM — F43.20 GRIEF REACTION: Status: ACTIVE | Noted: 2023-04-28

## 2023-04-28 PROCEDURE — 96127 BRIEF EMOTIONAL/BEHAV ASSMT: CPT | Mod: 95 | Performed by: FAMILY MEDICINE

## 2023-04-28 PROCEDURE — 99213 OFFICE O/P EST LOW 20 MIN: CPT | Mod: 95 | Performed by: FAMILY MEDICINE

## 2023-04-28 RX ORDER — ESCITALOPRAM OXALATE 5 MG/1
7.5 TABLET ORAL DAILY
Qty: 135 TABLET | Refills: 1 | Status: SHIPPED | OUTPATIENT
Start: 2023-04-28 | End: 2023-10-06

## 2023-04-28 ASSESSMENT — ANXIETY QUESTIONNAIRES
7. FEELING AFRAID AS IF SOMETHING AWFUL MIGHT HAPPEN: SEVERAL DAYS
1. FEELING NERVOUS, ANXIOUS, OR ON EDGE: NOT AT ALL
IF YOU CHECKED OFF ANY PROBLEMS ON THIS QUESTIONNAIRE, HOW DIFFICULT HAVE THESE PROBLEMS MADE IT FOR YOU TO DO YOUR WORK, TAKE CARE OF THINGS AT HOME, OR GET ALONG WITH OTHER PEOPLE: NOT DIFFICULT AT ALL
GAD7 TOTAL SCORE: 3
2. NOT BEING ABLE TO STOP OR CONTROL WORRYING: NOT AT ALL
5. BEING SO RESTLESS THAT IT IS HARD TO SIT STILL: NOT AT ALL
6. BECOMING EASILY ANNOYED OR IRRITABLE: NOT AT ALL
GAD7 TOTAL SCORE: 3
3. WORRYING TOO MUCH ABOUT DIFFERENT THINGS: SEVERAL DAYS

## 2023-04-28 ASSESSMENT — PATIENT HEALTH QUESTIONNAIRE - PHQ9
SUM OF ALL RESPONSES TO PHQ QUESTIONS 1-9: 3
5. POOR APPETITE OR OVEREATING: SEVERAL DAYS

## 2023-04-28 NOTE — PROGRESS NOTES
"Lynnette is a 32 year old who is being evaluated via a billable telephone visit.      What phone number would you like to be contacted at? [phone]  How would you like to obtain your AVS? Milly   Distant Location (provider location):  On-site    Assessment & Plan     Grief reaction     - DEPRESSION ACTION PLAN (DAP)  - escitalopram (LEXAPRO) 5 MG tablet; Take 1.5 tablets (7.5 mg) by mouth daily    Current mild episode of major depressive disorder without prior episode (H)  Will increase dose from 5mg up to 7.5mg   Advised if doing well on dose to f/u in 6 months  If having issues can reach out for telephone or evisit  Pt will call or RTC if symptoms worsen or do not improve.   - DEPRESSION ACTION PLAN (DAP)  - escitalopram (LEXAPRO) 5 MG tablet; Take 1.5 tablets (7.5 mg) by mouth daily             BMI:   Estimated body mass index is 24.96 kg/m  as calculated from the following:    Height as of 1/24/23: 1.572 m (5' 1.89\").    Weight as of 4/9/23: 61.7 kg (136 lb).           Annalisa Beck DO  [facility]   Lynnette is a 32 year old, presenting for the following health issues:  Depression and Anxiety        4/28/2023    11:50 AM   Additional Questions   Roomed by Sailaja SERVIN     Depression and Anxiety Follow-Up    How are you doing with your depression since your last visit? Improved -questions about using imitrex while on lexapro    How are you doing with your anxiety since your last visit?  Improved     Are you having other symptoms that might be associated with depression or anxiety? No    Have you had a significant life event? No     Do you have any concerns with your use of alcohol or other drugs? No     Started the escitalopram after her last visit  Took 5mg - 1/2 tablet then increased to 1 tablet daily   Less nightmares and less anxious  Feels like the edge has been taken off   Had more hopeless at the time in January and images were appearing   Just over the last week had " 1-2 days of symptoms  Feels like med gives her enough     Is in transition with therapist    Social History     Tobacco Use     Smoking status: Never     Smokeless tobacco: Never   Vaping Use     Vaping status: Never Used   Substance Use Topics     Alcohol use: Yes     Comment: 1-2 drinks per week     Drug use: Never         1/24/2023     2:07 PM 4/28/2023    11:51 AM   PHQ   PHQ-9 Total Score 17 3   Q9: Thoughts of better off dead/self-harm past 2 weeks Several days Not at all   F/U: Thoughts of suicide or self-harm Yes    F/U: Self harm-plan No    F/U: Self-harm action No    F/U: Safety concerns No          1/24/2023     2:57 PM 4/28/2023    11:51 AM   ROSA ELENA-7 SCORE   Total Score 15 3         4/28/2023    11:51 AM   Last PHQ-9   1.  Little interest or pleasure in doing things 1   2.  Feeling down, depressed, or hopeless 1   3.  Trouble falling or staying asleep, or sleeping too much 0   4.  Feeling tired or having little energy 1   5.  Poor appetite or overeating 0   6.  Feeling bad about yourself 0   7.  Trouble concentrating 0   8.  Moving slowly or restless 0   Q9: Thoughts of better off dead/self-harm past 2 weeks 0   PHQ-9 Total Score 3   Difficulty at work, home, or with people Not difficult at all         4/28/2023    11:51 AM   ROSA ELENA-7    1. Feeling nervous, anxious, or on edge 0   2. Not being able to stop or control worrying 0   3. Worrying too much about different things 1   4. Trouble relaxing 1   5. Being so restless that it is hard to sit still 0   6. Becoming easily annoyed or irritable 0   7. Feeling afraid, as if something awful might happen 1   ROSA ELENA-7 Total Score 3   If you checked any problems, how difficult have they made it for you to do your work, take care of things at home, or get along with other people? Not difficult at all       Suicide Assessment Five-step Evaluation and Treatment (SAFE-T)                Review of Systems   Constitutional, HEENT, cardiovascular, pulmonary, gi and gu systems  "are negative, except as otherwise noted.      Objective    Vitals - Patient Reported  Weight (Patient Reported): 61.7 kg (136 lb)  Height (Patient Reported): 157.2 cm (5' 1.89\")  BMI (Based on Pt Reported Ht/Wt): 24.96        Physical Exam   healthy, alert and no distress  PSYCH: Alert and oriented times 3; coherent speech, normal   rate and volume, able to articulate logical thoughts, able   to abstract reason, no tangential thoughts, no hallucinations   or delusions  Her affect is normal  RESP: No cough, no audible wheezing, able to talk in full sentences  Remainder of exam unable to be completed due to telephone visits                Phone call duration: 16 minutes    "

## 2023-05-04 ENCOUNTER — PATIENT OUTREACH (OUTPATIENT)
Dept: CARE COORDINATION | Facility: CLINIC | Age: 33
End: 2023-05-04
Payer: COMMERCIAL

## 2023-05-18 ENCOUNTER — PATIENT OUTREACH (OUTPATIENT)
Dept: CARE COORDINATION | Facility: CLINIC | Age: 33
End: 2023-05-18
Payer: COMMERCIAL

## 2023-07-19 ENCOUNTER — TELEPHONE (OUTPATIENT)
Dept: NEUROLOGY | Facility: CLINIC | Age: 33
End: 2023-07-19
Payer: COMMERCIAL

## 2023-07-25 ENCOUNTER — ANCILLARY PROCEDURE (OUTPATIENT)
Dept: GENERAL RADIOLOGY | Facility: CLINIC | Age: 33
End: 2023-07-25
Attending: FAMILY MEDICINE
Payer: COMMERCIAL

## 2023-07-25 ENCOUNTER — OFFICE VISIT (OUTPATIENT)
Dept: URGENT CARE | Facility: URGENT CARE | Age: 33
End: 2023-07-25
Payer: COMMERCIAL

## 2023-07-25 VITALS
WEIGHT: 136 LBS | SYSTOLIC BLOOD PRESSURE: 117 MMHG | DIASTOLIC BLOOD PRESSURE: 76 MMHG | BODY MASS INDEX: 24.96 KG/M2 | OXYGEN SATURATION: 98 % | HEART RATE: 81 BPM | TEMPERATURE: 97.5 F

## 2023-07-25 DIAGNOSIS — M79.672 LEFT FOOT PAIN: Primary | ICD-10-CM

## 2023-07-25 DIAGNOSIS — S99.922A FOOT INJURY, LEFT, INITIAL ENCOUNTER: ICD-10-CM

## 2023-07-25 PROCEDURE — 99213 OFFICE O/P EST LOW 20 MIN: CPT | Performed by: FAMILY MEDICINE

## 2023-07-25 PROCEDURE — 73630 X-RAY EXAM OF FOOT: CPT | Mod: TC | Performed by: RADIOLOGY

## 2023-07-25 NOTE — PATIENT INSTRUCTIONS
Follow up if  symptoms fail to improve or worsens   Pt understood and agreed with plan     Latonia Johnson MD

## 2023-07-25 NOTE — PROGRESS NOTES
Chief Complaint   Patient presents with    Urgent Care    Foot Pain     C/O foot pain for 12 days     Lynnette was seen today for urgent care and foot pain.    Diagnoses and all orders for this visit:    Left foot pain  -     XR Foot Left G/E 3 Views  -     Ankle/Foot Bracing Supplies DME Walking Boot; Left; Pneumatic; Short    Foot injury, left, initial encounter        PLAN:  See orders in epic    NO FRACTURE NOTED   Advised Patient to apply  cold packs to the area and also as she cannot walk because of the intensity of the pain and her job involves a lot of walking a cam walker was provided to the patient.  Patient was also encouraged to follow-up with Ortho if pain does not get better over the next 1 week time.  She does understand if the pain gets worse she needs to rest her foot more and avoid activities that would make the pain worse.  Rest ice elevation recommended to patient  Patient was also given a detailed handout on foot exercises which should help with the pain      Lynnette Vicente is a 32 year old female who presents with a chief complaint of left foot pain and tenderness.  Symptoms began 12 day(s) ago, are moderate and gradual onset  Context:Injury:Yes: .  Injury happened while she slipped and her feet bumped against bathroom tile . How: as above delayed pain, was able to bear weight directly after injury.   Pain exacerbated by weight-bearing Relieved by rest.  She treated it initially with ice and Tylenol. This is the first time this type of injury has occurred to this patient.     Past Medical History:   Diagnosis Date    Migraines     Rectal bleeding      Current Outpatient Medications   Medication Sig Dispense Refill    escitalopram (LEXAPRO) 5 MG tablet Take 1.5 tablets (7.5 mg) by mouth daily 135 tablet 1    levonorgestrel (MIRENA) 20 MCG/24HR IUD 1 each by Intrauterine route once      SUMAtriptan (IMITREX) 50 MG tablet Take 1 tablet (50 mg) by mouth at onset of headache for migraine May repeat in 2  hours. Max 4 tablets/24 hours. (Patient not taking: Reported on 4/28/2023) 9 tablet 4     Social History     Tobacco Use    Smoking status: Never     Passive exposure: Never    Smokeless tobacco: Never   Substance Use Topics    Alcohol use: Yes     Comment: 1-2 drinks per week       ROS:  Review of systems negative except as stated below    EXAM:   /76   Pulse 81   Temp 97.5  F (36.4  C) (Tympanic)   Wt 61.7 kg (136 lb)   SpO2 98%   BMI 24.96 kg/m    M/S Exam:left foot tenderness to palpation the plantar aspect of the foot over the distal second and third metatarsal bones   GENERAL APPEARANCE: healthy, alert and no distress  EXTREMITIES: peripheral pulses normal  SKIN: no suspicious lesions or rashes  NEURO: Normal strength and tone, sensory exam grossly normal, mentation intact and speech normal    X-RAY was done.    Latonia Johnson MD

## 2023-07-26 NOTE — TELEPHONE ENCOUNTER
[facility] Call Center    Phone Message    May a detailed message be left on voicemail: yes     Reason for Call: Other: Gerald called from [facility] on the MRI appeal requesting MRI test results. Please fax to:    Fax # [phone]     Please call Gerald back at [phone] with any questions.    Action Taken: Message routed to:  [facility] ([facility] Neurology    Travel Screening: Not Applicable

## 2023-07-27 ENCOUNTER — CARE COORDINATION (OUTPATIENT)
Dept: NEUROLOGY | Facility: CLINIC | Age: 33
End: 2023-07-27
Payer: COMMERCIAL

## 2023-08-19 ENCOUNTER — HEALTH MAINTENANCE LETTER (OUTPATIENT)
Age: 33
End: 2023-08-19

## 2023-08-23 ENCOUNTER — OFFICE VISIT (OUTPATIENT)
Dept: FAMILY MEDICINE | Facility: CLINIC | Age: 33
End: 2023-08-23
Payer: COMMERCIAL

## 2023-08-23 VITALS
RESPIRATION RATE: 18 BRPM | HEART RATE: 64 BPM | TEMPERATURE: 97.8 F | BODY MASS INDEX: 26.17 KG/M2 | SYSTOLIC BLOOD PRESSURE: 112 MMHG | OXYGEN SATURATION: 96 % | DIASTOLIC BLOOD PRESSURE: 76 MMHG | WEIGHT: 142.2 LBS | HEIGHT: 62 IN

## 2023-08-23 DIAGNOSIS — Z71.84 TRAVEL ADVICE ENCOUNTER: Primary | ICD-10-CM

## 2023-08-23 DIAGNOSIS — Z87.898 H/O NAUSEA: ICD-10-CM

## 2023-08-23 PROCEDURE — 99402 PREV MED CNSL INDIV APPRX 30: CPT | Mod: 25 | Performed by: NURSE PRACTITIONER

## 2023-08-23 PROCEDURE — 90691 TYPHOID VACCINE IM: CPT | Mod: GA | Performed by: NURSE PRACTITIONER

## 2023-08-23 PROCEDURE — 90471 IMMUNIZATION ADMIN: CPT | Mod: GA | Performed by: NURSE PRACTITIONER

## 2023-08-23 RX ORDER — ONDANSETRON 4 MG/1
4 TABLET, ORALLY DISINTEGRATING ORAL EVERY 8 HOURS PRN
Qty: 10 TABLET | Refills: 0 | Status: SHIPPED | OUTPATIENT
Start: 2023-08-23 | End: 2023-11-22

## 2023-08-23 RX ORDER — AZITHROMYCIN 500 MG/1
500 TABLET, FILM COATED ORAL DAILY
Qty: 3 TABLET | Refills: 0 | Status: SHIPPED | OUTPATIENT
Start: 2023-08-23 | End: 2023-08-26

## 2023-08-23 ASSESSMENT — PAIN SCALES - GENERAL: PAINLEVEL: NO PAIN (0)

## 2023-08-23 NOTE — PROGRESS NOTES
"Nurse Note ( Pre-Travel Consult)    Itinerary:  [facility]    Departure Date: 10/21/2023    Return Date: 11/10/2023    Length of Trip 3 weeks    Reason for Travel: Business         Urban or rural: both    Accommodations: Hotel  Private dwelling        IMMUNIZATION HISTORY  Have you received any immunizations within the past 4 weeks?  No  Have you ever fainted from having your blood drawn or from an injection?  No  Have you ever had a fever reaction to vaccination?  No  Have you ever had any bad reaction or side effect from any vaccination?  No  Have you ever had hepatitis A or B vaccine?  Yes  Do you live (or work closely) with anyone who has AIDS, an AIDS-like condition, any other immune disorder or who is on chemotherapy for cancer?  No  Do you have a family history of immunodeficiency?  No  Have you received any injection of immune globulin or any blood products during the past 12 months?  No    Patient roomed by Emani Bear RN      Subjective  Lynnette Vicente is a 32 year old female seen today alone for counsultation for international travel.   Patient will be departing in  2 month(s) and  traveling with co-worker(s).      Patient itinerary :  will be in the urban region of [facility] and [facility]  which risk for Yellow Fever, Dengue Fever, food borne illnesses, and motor vehicle accidents. exposure.      Patient's activities will include business.    Patient's country of birth is [place]    Special medical concerns: h/o nausea, motion sickness  Pre-travel questionnaire was completed by patient and reviewed by provider.     Vitals: /76   Pulse 64   Temp 97.8  F (36.6  C) (Temporal)   Resp 18   Ht 1.572 m (5' 1.89\")   Wt 64.5 kg (142 lb 3.2 oz)   LMP  (LMP Unknown)   SpO2 96%   BMI 26.10 kg/m    BMI= Body mass index is 26.1 kg/m .    EXAM:  General:  Well-nourished, well-developed in no acute distress.  Appears to be stated age, interacts appropriately and expresses understanding of information " given to patient.    Current Outpatient Medications   Medication Sig Dispense Refill    azithromycin (ZITHROMAX) 500 MG tablet Take 1 tablet (500 mg) by mouth daily for 3 doses Take 1 tablet a day for up to 3 days for severe diarrhea 3 tablet 0    ondansetron (ZOFRAN ODT) 4 MG ODT tab Take 1 tablet (4 mg) by mouth every 8 hours as needed for nausea 10 tablet 0    escitalopram (LEXAPRO) 5 MG tablet Take 1.5 tablets (7.5 mg) by mouth daily 135 tablet 1    levonorgestrel (MIRENA) 20 MCG/24HR IUD 1 each by Intrauterine route once      SUMAtriptan (IMITREX) 50 MG tablet Take 1 tablet (50 mg) by mouth at onset of headache for migraine May repeat in 2 hours. Max 4 tablets/24 hours. (Patient not taking: Reported on 4/28/2023) 9 tablet 4     Patient Active Problem List   Diagnosis    Hemorrhage of rectum and anus    Migraine without aura and without status migrainosus, not intractable    IUD (intrauterine device) in place    Ligament laxity    Grief reaction    Current mild episode of major depressive disorder without prior episode (H)     Allergies   Allergen Reactions    No Clinical Screening - See Comments      Unknown name of anesthesia used for dental procedure    Ivp Dye [Contrast Dye]          Immunizations discussed include:   Covid 19: Up to date  Hepatitis A:  Up to date  Hepatitis B: Up to date  Influenza: vaccine is not available  Typhoid: Ordered/given today, risks, benefits and side effects reviewed  Rabies: Declined  reviewed managment of a animal bite or scratch (washing wound, seek medical care within 24 hours for post exposure prophylaxis )  Yellow Fever: has had vaccine 17 years ago, is unsure if she wants the vaccine today.  Is recommended.  Discussed risk, benefit of vaccine second dose and risk of disease .   Patient will check insurance coverage  future order placed   Bulgarian Encephalitis: Not indicated  Meningococcus: Not indicated  Tetanus/Diphtheria: Up to date  Measles/Mumps/Rubella: Up to  date  Cholera: Not needed  Polio: Up to date  Pneumococcal: Under age of 65  Varicella: Immune by disease history per patient report  Shingrix: Not indicated  HPV:  Not indicated     TB: low risk     Altitude Exposure on this trip: [employer] at 8,700 ft   Past tolerance to Altitude: tolerates elevations up to above.  Is not concerned.     ASSESSMENT/PLAN:  Lynnette was seen today for travel clinic.    Diagnoses and all orders for this visit:    Travel advice encounter  -     YELLOW FEVER, LIVE SQ; Future  -     TYPHOID VACCINE, IM  -     azithromycin (ZITHROMAX) 500 MG tablet; Take 1 tablet (500 mg) by mouth daily for 3 doses Take 1 tablet a day for up to 3 days for severe diarrhea    H/O nausea  -     ondansetron (ZOFRAN ODT) 4 MG ODT tab; Take 1 tablet (4 mg) by mouth every 8 hours as needed for nausea      I have reviewed general recommendations for safe travel   including: food/water precautions, insect precautions,    roadway safety. Educational materials and [employer] report provided.    Malaraia prophylaxis recommended: not needed  Symptomatic treatment for traveler's diarrhea: azithromycin  Altitude illness prevention and treatment: declined discussed altitude concerns and preventative measures.   Zofran for the event of nausea       Evacuation insurance advised and resources were provided to patient.    Total visit time 30 minutes  with over 50% of time spent counseling patient and shared decision making as detailed above.    Alondra Tomas CNP  Certificate in Travel Health

## 2023-08-23 NOTE — NURSING NOTE
Per orders of Alondra Tomas, injection of Typhoid given by Emani Bear RN. Prior to immunization administration, verified patients identity using patient s name and date of birth. Patient instructed to remain in clinic for 15 minutes afterwards, and to report any adverse reaction to me or clinic staff immediately.    Emani Bear RN on 8/23/2023 at 5:10 PM.    Please see Immunization Activity for additional information.

## 2023-08-23 NOTE — PATIENT INSTRUCTIONS
Thank you for visiting the [facility] : [phone]  Today August 23, 2023 you received the    Typhoid - injectable. This vaccine is valid for two years.     Consider Yellow Fever    Follow up vaccine appointments can be made as a NURSE ONLY visit at the Travel Clinic, (BE PREPARED TO WAIT, ) or at designated [facility].    If you are receiving the Rabies vaccines series, it is important that you follow the exact schedule ordered.     Pre-travel     We recommend that you purchase Medical Evacuation Insurance prior to your departure.  Https://wwwnc.cdc.gov/travel/page/insurance    [place] your travel plans with the  Department of State through STEP ( Smart Traveler Enrollment Program ) https://step.state.gov.  STEP is a free service to allow [place] citizens and nationals traveling and living abroad to enroll their trip with the nearest U.S. Embassy or Consulate.    Animal Exposure: Avoid all mammals even if they look healthy.  If there is a bite, scratch or even a lick, wash area immediately with soap and water for 15 minutes and seek medical care within 24 hours for evaluation of Rabies post exposure treatment.  Contact your Medical Evacuation Insurance.    Repiratory illness prevention strategies ( Covid and Influenza ) CDC recommendations:  Consider wearing a mask in crowded or poorly ventilated indoor areas, including on public transportation and in transportation hubs.  Hand washing: frequent, thorough handwashing with soap and water for 20 seconds. Use an alcohol-based hand  with at least 60% alcohol if soap and water are not readily available. Avoid touching face, nose, eyes, mouth unless you have done appropriate hand washing as above.  VACCINES: Staying up to date on COVID-19 vaccines significantly lowers the risk of getting very sick, being hospitalized, or dying from COVID-19. CDC recommends that everyone stay up to date on their COVID-19  vaccines, especially people with weakened immune systems.    Travel Covid 19 Testing:  updated 12/06/2021  International travelers: Pre-travel:  See country specific Embassy websites or airline websites.    Post travel: CDC recommends getting tested 3-5 days after your trip     Post-travel illness:  Contact your provider or [facility] if you develop a fever, rash, cough, diarrhea or other symptoms for up to 1 year after travel.  Inform your healthcare provider when and where you traveled to.    Please call the [facility] with any questions [phone]  Or send your provider a 'My Chart' note.

## 2023-09-25 ENCOUNTER — MYC MEDICAL ADVICE (OUTPATIENT)
Dept: FAMILY MEDICINE | Facility: CLINIC | Age: 33
End: 2023-09-25
Payer: COMMERCIAL

## 2023-10-06 ENCOUNTER — VIRTUAL VISIT (OUTPATIENT)
Dept: FAMILY MEDICINE | Facility: CLINIC | Age: 33
End: 2023-10-06
Payer: COMMERCIAL

## 2023-10-06 DIAGNOSIS — F32.0 CURRENT MILD EPISODE OF MAJOR DEPRESSIVE DISORDER WITHOUT PRIOR EPISODE (H): ICD-10-CM

## 2023-10-06 DIAGNOSIS — F43.21 GRIEF REACTION: ICD-10-CM

## 2023-10-06 PROCEDURE — 99214 OFFICE O/P EST MOD 30 MIN: CPT | Mod: VID | Performed by: FAMILY MEDICINE

## 2023-10-06 RX ORDER — ESCITALOPRAM OXALATE 10 MG/1
10 TABLET ORAL DAILY
Qty: 90 TABLET | Refills: 1 | Status: SHIPPED | OUTPATIENT
Start: 2023-10-06 | End: 2024-05-22

## 2023-10-06 ASSESSMENT — PATIENT HEALTH QUESTIONNAIRE - PHQ9
SUM OF ALL RESPONSES TO PHQ QUESTIONS 1-9: 17
SUM OF ALL RESPONSES TO PHQ QUESTIONS 1-9: 17
10. IF YOU CHECKED OFF ANY PROBLEMS, HOW DIFFICULT HAVE THESE PROBLEMS MADE IT FOR YOU TO DO YOUR WORK, TAKE CARE OF THINGS AT HOME, OR GET ALONG WITH OTHER PEOPLE: VERY DIFFICULT

## 2023-10-06 NOTE — PROGRESS NOTES
"Lynnette is a 32 year old who is being evaluated via a billable video visit.      How would you like to obtain your AVS? MyChart  If the video visit is dropped, the invitation should be resent by: Text to cell phone: [phone]  Will anyone else be joining your video visit? No          Assessment & Plan     Grief reaction  Worsening depression, grief and anxiety back in August   She was on 5mg of lexapro and she tried to increase to 7.5mg but trouble cutting dose so increase to 10mg   Will fill for 10mg now   Pt will call or RTC if symptoms worsen or do not improve.   - escitalopram (LEXAPRO) 10 MG tablet; Take 1 tablet (10 mg) by mouth daily    Current mild episode of major depressive disorder without prior episode (H24)     - escitalopram (LEXAPRO) 10 MG tablet; Take 1 tablet (10 mg) by mouth daily             BMI:   Estimated body mass index is 26.1 kg/m  as calculated from the following:    Height as of 8/23/23: 1.572 m (5' 1.89\").    Weight as of 8/23/23: 64.5 kg (142 lb 3.2 oz).           Annalisa Beck [facility]   Lynnette is a 32 year old, presenting for the following health issues:  No chief complaint on file.        10/6/2023     2:00 PM   Additional Questions   Roomed by [facility]     Depression and Anxiety Follow-Up  How are you doing with your depression since your last visit? Managing has been good   How are you doing with your anxiety since your last visit?  Good, but has noticed that as she takes her medication at bedtime, when she wakes up in the morning, she feels anxious than usual.   - would like to know if this is a side effect from taking the medication before bedtime   Are you having other symptoms that might be associated with depression or anxiety? No  Have you had a significant life event? Job Concerns and OTHER: Inability to see Therapist anymore and  death anniversary of a loss is coming up   Do you have any concerns with your use of alcohol or " other drugs? No    Anxiety -   Was started on lexapro 5mg 6-8 months ago -   Felt like it was helpful   August was a tough month - anniversary of mothers death and a dear friend  Liberty like things worsened in August - was at 5mg and wanted to go to 7.5mg  Did self increase her dose to 10mg   Getting laid off from work  Grief counseling ended -     Side effects -   Taking medication at night time   Often when she first wakes in the morning feels like thoughts are racing and feels anxious  Got glasses and feels like some of strange headaches are better -     Hormonal - has IUD for 8 years   Social History     Tobacco Use    Smoking status: Never     Passive exposure: Never    Smokeless tobacco: Never   Vaping Use    Vaping Use: Never used   Substance Use Topics    Alcohol use: Yes     Comment: 1-2 drinks per week    Drug use: Never         1/24/2023     2:07 PM 4/28/2023    11:51 AM 10/6/2023     1:48 PM   PHQ   PHQ-9 Total Score 17 3 17   Q9: Thoughts of better off dead/self-harm past 2 weeks Several days Not at all Not at all   F/U: Thoughts of suicide or self-harm Yes     F/U: Self harm-plan No     F/U: Self-harm action No     F/U: Safety concerns No           1/24/2023     2:57 PM 4/28/2023    11:51 AM   ROSA ELENA-7 SCORE   Total Score 15 3         Suicide Assessment Five-step Evaluation and Treatment (SAFE-T)            Review of Systems   Constitutional, HEENT, cardiovascular, pulmonary, gi and gu systems are negative, except as otherwise noted.      Objective           Vitals:  No vitals were obtained today due to virtual visit.    Physical Exam   GENERAL: Healthy, alert and no distress  EYES: Eyes grossly normal to inspection.  No discharge or erythema, or obvious scleral/conjunctival abnormalities.  RESP: No audible wheeze, cough, or visible cyanosis.  No visible retractions or increased work of breathing.    SKIN: Visible skin clear. No significant rash, abnormal pigmentation or lesions.  NEURO: Cranial nerves  grossly intact.  Mentation and speech appropriate for age.  PSYCH: Mentation appears normal, affect normal/bright, judgement and insight intact, normal speech and appearance well-groomed.                Video-Visit Details    Type of service:  Video Visit     Originating Location (pt. Location): Home    Distant Location (provider location):  On-site  Platform used for Video Visit: Nisha

## 2023-10-18 ENCOUNTER — ALLIED HEALTH/NURSE VISIT (OUTPATIENT)
Dept: FAMILY MEDICINE | Facility: CLINIC | Age: 33
End: 2023-10-18
Payer: COMMERCIAL

## 2023-10-18 DIAGNOSIS — Z71.84 TRAVEL ADVICE ENCOUNTER: ICD-10-CM

## 2023-10-18 PROCEDURE — 90717 YELLOW FEVER VACCINE SUBQ: CPT

## 2023-10-18 PROCEDURE — 99207 PR NO CHARGE NURSE ONLY: CPT

## 2023-10-18 PROCEDURE — 90471 IMMUNIZATION ADMIN: CPT

## 2023-11-14 ENCOUNTER — TRANSFERRED RECORDS (OUTPATIENT)
Dept: HEALTH INFORMATION MANAGEMENT | Facility: CLINIC | Age: 33
End: 2023-11-14

## 2023-11-15 ENCOUNTER — TRANSFERRED RECORDS (OUTPATIENT)
Dept: MULTI SPECIALTY CLINIC | Facility: CLINIC | Age: 33
End: 2023-11-15
Payer: COMMERCIAL

## 2023-11-15 LAB — PAP SMEAR - HIM PATIENT REPORTED: NORMAL

## 2023-11-22 ENCOUNTER — OFFICE VISIT (OUTPATIENT)
Dept: FAMILY MEDICINE | Facility: CLINIC | Age: 33
End: 2023-11-22
Payer: COMMERCIAL

## 2023-11-22 VITALS
RESPIRATION RATE: 16 BRPM | HEART RATE: 56 BPM | TEMPERATURE: 98.1 F | SYSTOLIC BLOOD PRESSURE: 118 MMHG | OXYGEN SATURATION: 97 % | DIASTOLIC BLOOD PRESSURE: 73 MMHG | HEIGHT: 63 IN | BODY MASS INDEX: 25.05 KG/M2 | WEIGHT: 141.4 LBS

## 2023-11-22 DIAGNOSIS — J34.1 MUCOUS RETENTION CYST OF MAXILLARY SINUS: ICD-10-CM

## 2023-11-22 DIAGNOSIS — Z97.5 IUD (INTRAUTERINE DEVICE) IN PLACE: ICD-10-CM

## 2023-11-22 DIAGNOSIS — Z11.59 NEED FOR HEPATITIS C SCREENING TEST: ICD-10-CM

## 2023-11-22 DIAGNOSIS — Z12.4 CERVICAL CANCER SCREENING: ICD-10-CM

## 2023-11-22 DIAGNOSIS — Z00.00 ROUTINE GENERAL MEDICAL EXAMINATION AT A HEALTH CARE FACILITY: Primary | ICD-10-CM

## 2023-11-22 DIAGNOSIS — F43.21 GRIEF REACTION: ICD-10-CM

## 2023-11-22 DIAGNOSIS — F32.0 CURRENT MILD EPISODE OF MAJOR DEPRESSIVE DISORDER WITHOUT PRIOR EPISODE (H): ICD-10-CM

## 2023-11-22 DIAGNOSIS — H93.12 TINNITUS, LEFT: ICD-10-CM

## 2023-11-22 LAB — HBA1C MFR BLD: 4.8 % (ref 0–5.6)

## 2023-11-22 PROCEDURE — 96127 BRIEF EMOTIONAL/BEHAV ASSMT: CPT | Performed by: FAMILY MEDICINE

## 2023-11-22 PROCEDURE — 80053 COMPREHEN METABOLIC PANEL: CPT | Performed by: FAMILY MEDICINE

## 2023-11-22 PROCEDURE — 83036 HEMOGLOBIN GLYCOSYLATED A1C: CPT | Performed by: FAMILY MEDICINE

## 2023-11-22 PROCEDURE — 99213 OFFICE O/P EST LOW 20 MIN: CPT | Mod: 25 | Performed by: FAMILY MEDICINE

## 2023-11-22 PROCEDURE — 36415 COLL VENOUS BLD VENIPUNCTURE: CPT | Performed by: FAMILY MEDICINE

## 2023-11-22 PROCEDURE — 86803 HEPATITIS C AB TEST: CPT | Performed by: FAMILY MEDICINE

## 2023-11-22 PROCEDURE — 99395 PREV VISIT EST AGE 18-39: CPT | Performed by: FAMILY MEDICINE

## 2023-11-22 RX ORDER — ESCITALOPRAM OXALATE 5 MG/1
5 TABLET ORAL DAILY
Qty: 90 TABLET | Refills: 1 | Status: SHIPPED | OUTPATIENT
Start: 2023-11-22 | End: 2024-05-22 | Stop reason: SINTOL

## 2023-11-22 ASSESSMENT — ANXIETY QUESTIONNAIRES
4. TROUBLE RELAXING: SEVERAL DAYS
7. FEELING AFRAID AS IF SOMETHING AWFUL MIGHT HAPPEN: SEVERAL DAYS
1. FEELING NERVOUS, ANXIOUS, OR ON EDGE: MORE THAN HALF THE DAYS
GAD7 TOTAL SCORE: 8
GAD7 TOTAL SCORE: 8
2. NOT BEING ABLE TO STOP OR CONTROL WORRYING: MORE THAN HALF THE DAYS
5. BEING SO RESTLESS THAT IT IS HARD TO SIT STILL: NOT AT ALL
3. WORRYING TOO MUCH ABOUT DIFFERENT THINGS: MORE THAN HALF THE DAYS
IF YOU CHECKED OFF ANY PROBLEMS ON THIS QUESTIONNAIRE, HOW DIFFICULT HAVE THESE PROBLEMS MADE IT FOR YOU TO DO YOUR WORK, TAKE CARE OF THINGS AT HOME, OR GET ALONG WITH OTHER PEOPLE: SOMEWHAT DIFFICULT
6. BECOMING EASILY ANNOYED OR IRRITABLE: NOT AT ALL

## 2023-11-22 ASSESSMENT — ENCOUNTER SYMPTOMS
SHORTNESS OF BREATH: 0
CHILLS: 0
HEADACHES: 1
SORE THROAT: 0
ARTHRALGIAS: 0
FREQUENCY: 0
ABDOMINAL PAIN: 1
PALPITATIONS: 0
NERVOUS/ANXIOUS: 1
WEAKNESS: 0
EYE PAIN: 1
HEMATOCHEZIA: 0
HEARTBURN: 0
DIZZINESS: 0
HEMATURIA: 0
JOINT SWELLING: 0
PARESTHESIAS: 0
FEVER: 0
CONSTIPATION: 0
DIARRHEA: 0
COUGH: 0
NAUSEA: 0
DYSURIA: 0
MYALGIAS: 1

## 2023-11-22 ASSESSMENT — PATIENT HEALTH QUESTIONNAIRE - PHQ9
SUM OF ALL RESPONSES TO PHQ QUESTIONS 1-9: 11
SUM OF ALL RESPONSES TO PHQ QUESTIONS 1-9: 11
10. IF YOU CHECKED OFF ANY PROBLEMS, HOW DIFFICULT HAVE THESE PROBLEMS MADE IT FOR YOU TO DO YOUR WORK, TAKE CARE OF THINGS AT HOME, OR GET ALONG WITH OTHER PEOPLE: VERY DIFFICULT

## 2023-11-22 ASSESSMENT — PAIN SCALES - GENERAL: PAINLEVEL: NO PAIN (0)

## 2023-11-22 NOTE — PROGRESS NOTES
SUBJECTIVE:   Lynnette is a 32 year old, presenting for the following:  Physical        11/22/2023    11:31 AM   Additional Questions   Roomed by Halima       Healthy Habits:     Getting at least 3 servings of Calcium per day:  Yes    Bi-annual eye exam:  Yes    Dental care twice a year:  NO    Sleep apnea or symptoms of sleep apnea:  None    Diet:  Vegetarian/vegan    Frequency of exercise:  1 day/week    Duration of exercise:  45-60 minutes    Taking medications regularly:  Yes    Additional concerns today:  Yes    Answers submitted by the patient for this visit:  Adult Preventative Visit on 11/22/2023 11:30 AM with Annalisa Beck  ROSA ELENA-7 (Submitted on 11/22/2023)  ROSA ELENA 7 TOTAL SCORE: 8    Today's PHQ-9 Score:       11/22/2023    11:08 AM   PHQ-9 SCORE   PHQ-9 Total Score MyChart 11 (Moderate depression)   PHQ-9 Total Score 11     Did pap and IUD swab with [facility] last week          Pap smear done on this date: 11/14/23 (approximately), by this group: [facility], results were unknown at this time.           PROBLEMS TO ADD ON...  -------------------------------------  Anxiety, grief and depression - on lexapro - 10mg   Feels like dose could be increased           Social History     Tobacco Use    Smoking status: Never     Passive exposure: Never    Smokeless tobacco: Never   Substance Use Topics    Alcohol use: Yes     Comment: 1-2 drinks per week             11/22/2023    11:15 AM   Alcohol Use   Prescreen: >3 drinks/day or >7 drinks/week? No       Reviewed orders with patient.  Reviewed health maintenance and updated orders accordingly - Yes  Patient Active Problem List   Diagnosis    Hemorrhage of rectum and anus    Migraine without aura and without status migrainosus, not intractable    IUD (intrauterine device) in place    Ligament laxity    Grief reaction    Current mild episode of major depressive disorder without prior episode (H24)     History reviewed. No pertinent surgical history.    Social  History     Tobacco Use    Smoking status: Never     Passive exposure: Never    Smokeless tobacco: Never   Substance Use Topics    Alcohol use: Yes     Comment: 1-2 drinks per week     Family History   Problem Relation Age of Onset    Hypertension Mother     Cardiomyopathy Mother     Heart Failure Mother     Hydrocephalus Mother     Skin Cancer Mother     Diabetes Type 2  Father     Ulcerative Colitis Maternal Grandfather     Cardiomyopathy Brother     Anesthesia Reaction No family hx of     Colon Polyps No family hx of     Cancer - colorectal No family hx of     Crohn's Disease No family hx of            Breast Cancer Screenin/3/2022     9:18 AM   Breast CA Risk Assessment (FHS-7)   Do you have a family history of breast, colon, or ovarian cancer? No / Unknown       click delete button to remove this line now  Patient under 40 years of age: Routine Mammogram Screening not recommended.   Pertinent mammograms are reviewed under the imaging tab.    History of abnormal Pap smear: NO - age 30-65 PAP every 5 years with negative HPV co-testing recommended  Just did last week at [facility] -       Reviewed and updated as needed this visit by clinical staff   Tobacco  Allergies  Meds  Problems  Med Hx  Surg Hx  Fam Hx          Reviewed and updated as needed this visit by Provider   Tobacco  Allergies  Meds  Problems  Med Hx  Surg Hx  Fam Hx             Review of Systems   Constitutional:  Negative for chills and fever.   HENT:  Negative for congestion, ear pain, hearing loss and sore throat.    Eyes:  Positive for pain and visual disturbance.   Respiratory:  Negative for cough and shortness of breath.    Cardiovascular:  Negative for chest pain, palpitations and peripheral edema.   Gastrointestinal:  Positive for abdominal pain. Negative for constipation, diarrhea, heartburn, hematochezia and nausea.   Genitourinary:  Negative for dysuria, frequency, genital sores, hematuria and urgency.  "  Musculoskeletal:  Positive for myalgias. Negative for arthralgias and joint swelling.   Skin:  Negative for rash.   Neurological:  Positive for headaches. Negative for dizziness, weakness and paresthesias.   Psychiatric/Behavioral:  Positive for mood changes. The patient is nervous/anxious.           OBJECTIVE:   /73   Pulse 56   Temp 98.1  F (36.7  C) (Temporal)   Resp 16   Ht 1.598 m (5' 2.9\")   Wt 64.1 kg (141 lb 6.4 oz)   LMP  (LMP Unknown)   SpO2 97%   Breastfeeding No   BMI 25.13 kg/m    Physical Exam  GENERAL: healthy, alert and no distress  EYES: Eyes grossly normal to inspection, PERRL and conjunctivae and sclerae normal  HENT: ear canals and TM's normal, nose and mouth without ulcers or lesions  NECK: no adenopathy, no asymmetry, masses, or scars and thyroid normal to palpation  RESP: lungs clear to auscultation - no rales, rhonchi or wheezes  BREAST: normal without masses, tenderness or nipple discharge and no palpable axillary masses or adenopathy  CV: regular rate and rhythm, normal S1 S2, no S3 or S4, no murmur, click or rub, no peripheral edema and peripheral pulses strong  ABDOMEN: soft, nontender, no hepatosplenomegaly, no masses and bowel sounds normal  MS: no gross musculoskeletal defects noted, no edema  SKIN: no suspicious lesions or rashes  NEURO: Normal strength and tone, mentation intact and speech normal  PSYCH: mentation appears normal, affect normal/bright    Diagnostic Test Results:  Labs reviewed in Epic    ASSESSMENT/PLAN:   (Z00.00) Routine general medical examination at a health care facility  (primary encounter diagnosis)  Comment:    Plan: Comprehensive metabolic panel (BMP + Alb, Alk         Phos, ALT, AST, Total. Bili, TP), Hemoglobin         A1c             (Z11.59) Need for hepatitis C screening test  Comment:    Plan: Hepatitis C Screen Reflex to HCV RNA Quant and         Genotype             (Z12.4) Cervical cancer screening  Comment:    Plan:  already " "completed at [facility]  Faxed of record request     (Z97.5) IUD (intrauterine device) in place  Comment: replaced at [facility] last week - with pap test   Plan:      (F43.21) Grief reaction  Comment: dose increased to 15mg   Plan: escitalopram (LEXAPRO) 5 MG tablet,         Comprehensive metabolic panel (BMP + Alb, Alk         Phos, ALT, AST, Total. Bili, TP)             (F32.0) Current mild episode of major depressive disorder without prior episode (H24)  Comment:    Plan: escitalopram (LEXAPRO) 5 MG tablet             (H93.12) Tinnitus, left  Comment:    Plan: Adult ENT  Referral             (J34.1) Mucous retention cyst of maxillary sinus  Comment:    Plan: Adult ENT  Referral                   COUNSELING:  Reviewed preventive health counseling, as reflected in patient instructions      BMI:   Estimated body mass index is 25.13 kg/m  as calculated from the following:    Height as of this encounter: 1.598 m (5' 2.9\").    Weight as of this encounter: 64.1 kg (141 lb 6.4 oz).   Weight management plan: Discussed healthy diet and exercise guidelines      She reports that she has never smoked. She has never been exposed to tobacco smoke. She has never used smokeless tobacco.          Annalisa Beck, [facility]  "

## 2023-11-23 LAB
ALBUMIN SERPL BCG-MCNC: 4.5 G/DL (ref 3.5–5.2)
ALP SERPL-CCNC: 54 U/L (ref 40–150)
ALT SERPL W P-5'-P-CCNC: 17 U/L (ref 0–50)
ANION GAP SERPL CALCULATED.3IONS-SCNC: 8 MMOL/L (ref 7–15)
AST SERPL W P-5'-P-CCNC: 25 U/L (ref 0–45)
BILIRUB SERPL-MCNC: 0.5 MG/DL
BUN SERPL-MCNC: 11.8 MG/DL (ref 6–20)
CALCIUM SERPL-MCNC: 8.7 MG/DL (ref 8.6–10)
CHLORIDE SERPL-SCNC: 103 MMOL/L (ref 98–107)
CREAT SERPL-MCNC: 0.68 MG/DL (ref 0.51–0.95)
DEPRECATED HCO3 PLAS-SCNC: 26 MMOL/L (ref 22–29)
EGFRCR SERPLBLD CKD-EPI 2021: >90 ML/MIN/1.73M2
GLUCOSE SERPL-MCNC: 79 MG/DL (ref 70–99)
POTASSIUM SERPL-SCNC: 4.2 MMOL/L (ref 3.4–5.3)
PROT SERPL-MCNC: 7.1 G/DL (ref 6.4–8.3)
SODIUM SERPL-SCNC: 137 MMOL/L (ref 135–145)

## 2023-11-24 LAB — HCV AB SERPL QL IA: NONREACTIVE

## 2023-11-24 NOTE — RESULT ENCOUNTER NOTE
Dear Lynnette,   Your test results are all back -   -Liver and gallbladder tests are normal (ALT,AST, Alk phos, bilirubin), kidney function is normal (Cr, GFR), sodium is normal, potassium is normal, calcium is normal, glucose is normal.  -A1C (diabetic test) is normal and indicates that your blood sugar has been in a normal range the last 3 months.  -Hepatitis C antibody screen test shows no signs of a previous hepatitis C infection.  Let us know if you have any questions.  -Annalisa Beck, DO

## 2024-01-04 ENCOUNTER — TRANSFERRED RECORDS (OUTPATIENT)
Dept: HEALTH INFORMATION MANAGEMENT | Facility: CLINIC | Age: 34
End: 2024-01-04
Payer: COMMERCIAL

## 2024-03-28 ENCOUNTER — VIRTUAL VISIT (OUTPATIENT)
Dept: NEUROLOGY | Facility: CLINIC | Age: 34
End: 2024-03-28
Payer: MEDICAID

## 2024-03-28 DIAGNOSIS — G43.009 MIGRAINE WITHOUT AURA AND WITHOUT STATUS MIGRAINOSUS, NOT INTRACTABLE: Primary | ICD-10-CM

## 2024-03-28 PROCEDURE — 99214 OFFICE O/P EST MOD 30 MIN: CPT | Mod: 95 | Performed by: PSYCHIATRY & NEUROLOGY

## 2024-03-28 NOTE — LETTER
3/28/2024       RE: Lynnette Vicente  [address]  [place] [id number]     Dear Colleague,    Thank you for referring your patient, Lynnette Vicente, to the [facility] [place] [facility]. Please see a copy of my visit note below.    [facility] Neurology Follow Up Visit    Lynnette Vicente MRN# [id number]   Age: 33 year old YOB: 1990     Brief history of symptoms: The patient was initially seen in neurologic consultation on 2/3/2023 and most recently 4/4/2023 for evaluation of dysesthesias and eye pain. Please see the comprehensive neurologic consultation notes from those dates in the Epic records for details.     Testing included:  - MRI brain 2/7/2023 did not show evidence of optic neuritis, demyelinating lesions, tumor, or infarction upon review today and at the time of imaging.  - Serum studies 2/3/2023 were normal (B6, Vitamin D, ALEK, ANCA, B12, ESR, CRP)    At our last visit, the patient was indicating she was having migraines once per week but they were treated well with ibuprofen use occasionally.  Her eye pain was mostly gone at the time of our last visit. She was not interested in daily migraine medications but was okay with starting medications for abortive treatment of migraine (sumatriptan).  She was to repeat her Head CT in 6 months, and     Today, the patient did use sumatriptan for eye pain and migraine.  It didn't really help much, and it made her feel tight in the chest.  She has been visiting an optometrist for her eye pain, and it still remains.  She still feels her vision is blurry at times.  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  Her headaches and migraines are otherwise well controlled with ibuprofen use.     Physical Exam:   General: Seated comfortably in no acute distress.  Neurologic:     Mental Status: Fully alert, attentive and oriented. Speech clear and fluent, no paraphasic  errors.     Cranial Nerves: EOMI with normal smooth pursuit. Facial movements symmetric. Hearing not formally tested but intact to conversation.  No dysarthria.     Motor: No tremors or other abnormal movements observed.          Assessment and Plan:   Assessment:  Eye pain (L>R), unclear etiology  Migraine w/out aura    The patient's eye pain persists.  She has no findings on MRI which would indicate an etiology, and it doesn't appear her optometrist feels there is optic neuritis given repeat evaluations.  Overall, she may benefit from a pain clinic referral through her PCP for ongoing management of her eye pain, as there are signs/reports of supra-trochlear nerve irritation along with it (Potential for blocks to be done to help quell the pain).   Regarding her migraines, the patient is hesitant to start a daily prophylactic and didn't respond well to triptans (chest tightness). If she would like to trial a daily medication, then she could return to clinic to do so and possible consider CGRP antagonist therapies if those trials fail.     Plan:  PCP to consider pain clinic referral  Patient is welcome to return to clinic for management of migraines (not thought to be relate to eye pain)    Follow up in Neurology clinic as needed should new concerns arise.        Total time today (31 min) in this patient encounter was spent on pre-charting, counseling and/or coordination of care.         Again, thank you for allowing me to participate in the care of your patient.      Sincerely,    Deandre Mendes, DO

## 2024-03-28 NOTE — PROGRESS NOTES
[facility] Neurology Follow Up Visit    Lynnette Vicente MRN# [id number]   Age: 33 year old YOB: 1990     Brief history of symptoms: The patient was initially seen in neurologic consultation on 2/3/2023 and most recently 4/4/2023 for evaluation of dysesthesias and eye pain. Please see the comprehensive neurologic consultation notes from those dates in the Epic records for details.     Testing included:  - MRI brain 2/7/2023 did not show evidence of optic neuritis, demyelinating lesions, tumor, or infarction upon review today and at the time of imaging.  - Serum studies 2/3/2023 were normal (B6, Vitamin D, ALEK, ANCA, B12, ESR, CRP)    At our last visit, the patient was indicating she was having migraines once per week but they were treated well with ibuprofen use occasionally.  Her eye pain was mostly gone at the time of our last visit. She was not interested in daily migraine medications but was okay with starting medications for abortive treatment of migraine (sumatriptan).  She was to repeat her Head CT in 6 months, and     Today, the patient did use sumatriptan for eye pain and migraine.  It didn't really help much, and it made her feel tight in the chest.  She has been visiting an optometrist for her eye pain, and it still remains.  She still feels her vision is blurry at times.  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  Her headaches and migraines are otherwise well controlled with ibuprofen use.     Physical Exam:   General: Seated comfortably in no acute distress.  Neurologic:     Mental Status: Fully alert, attentive and oriented. Speech clear and fluent, no paraphasic errors.     Cranial Nerves: EOMI with normal smooth pursuit. Facial movements symmetric. Hearing not formally tested but intact to conversation.  No dysarthria.     Motor: No tremors or other abnormal movements observed.          Assessment and Plan:   Assessment:  Eye pain (L>R), unclear etiology  Migraine w/out  aura    The patient's eye pain persists.  She has no findings on MRI which would indicate an etiology, and it doesn't appear her optometrist feels there is optic neuritis given repeat evaluations.  Overall, she may benefit from a pain clinic referral through her PCP for ongoing management of her eye pain, as there are signs/reports of supra-trochlear nerve irritation along with it (Potential for blocks to be done to help quell the pain).   Regarding her migraines, the patient is hesitant to start a daily prophylactic and didn't respond well to triptans (chest tightness). If she would like to trial a daily medication, then she could return to clinic to do so and possible consider CGRP antagonist therapies if those trials fail.     Plan:  PCP to consider pain clinic referral  Patient is welcome to return to clinic for management of migraines (not thought to be relate to eye pain)    Follow up in Neurology clinic as needed should new concerns arise.    REBECA Mendes D.O.   of Neurology    Total time today (31 min) in this patient encounter was spent on pre-charting, counseling and/or coordination of care.

## 2024-04-04 ENCOUNTER — TRANSFERRED RECORDS (OUTPATIENT)
Dept: HEALTH INFORMATION MANAGEMENT | Facility: CLINIC | Age: 34
End: 2024-04-04

## 2024-05-22 ENCOUNTER — VIRTUAL VISIT (OUTPATIENT)
Dept: FAMILY MEDICINE | Facility: CLINIC | Age: 34
End: 2024-05-22
Payer: COMMERCIAL

## 2024-05-22 ENCOUNTER — ORDERS ONLY (AUTO-RELEASED) (OUTPATIENT)
Dept: FAMILY MEDICINE | Facility: CLINIC | Age: 34
End: 2024-05-22
Payer: COMMERCIAL

## 2024-05-22 DIAGNOSIS — F32.0 CURRENT MILD EPISODE OF MAJOR DEPRESSIVE DISORDER WITHOUT PRIOR EPISODE (H): ICD-10-CM

## 2024-05-22 DIAGNOSIS — R00.2 HEART PALPITATIONS: ICD-10-CM

## 2024-05-22 DIAGNOSIS — F43.21 GRIEF REACTION: ICD-10-CM

## 2024-05-22 DIAGNOSIS — Z82.49 FAMILY HISTORY OF CARDIOMYOPATHY: ICD-10-CM

## 2024-05-22 DIAGNOSIS — G43.009 MIGRAINE WITHOUT AURA AND WITHOUT STATUS MIGRAINOSUS, NOT INTRACTABLE: ICD-10-CM

## 2024-05-22 DIAGNOSIS — Z82.49 FAMILY HISTORY OF CARDIOMYOPATHY: Primary | ICD-10-CM

## 2024-05-22 PROCEDURE — 99214 OFFICE O/P EST MOD 30 MIN: CPT | Mod: 95 | Performed by: FAMILY MEDICINE

## 2024-05-22 RX ORDER — ESCITALOPRAM OXALATE 10 MG/1
10 TABLET ORAL DAILY
Qty: 90 TABLET | Refills: 1 | Status: SHIPPED | OUTPATIENT
Start: 2024-05-22

## 2024-05-22 ASSESSMENT — ANXIETY QUESTIONNAIRES
7. FEELING AFRAID AS IF SOMETHING AWFUL MIGHT HAPPEN: SEVERAL DAYS
1. FEELING NERVOUS, ANXIOUS, OR ON EDGE: NOT AT ALL
5. BEING SO RESTLESS THAT IT IS HARD TO SIT STILL: NOT AT ALL
3. WORRYING TOO MUCH ABOUT DIFFERENT THINGS: SEVERAL DAYS
GAD7 TOTAL SCORE: 3
2. NOT BEING ABLE TO STOP OR CONTROL WORRYING: SEVERAL DAYS
8. IF YOU CHECKED OFF ANY PROBLEMS, HOW DIFFICULT HAVE THESE MADE IT FOR YOU TO DO YOUR WORK, TAKE CARE OF THINGS AT HOME, OR GET ALONG WITH OTHER PEOPLE?: NOT DIFFICULT AT ALL
6. BECOMING EASILY ANNOYED OR IRRITABLE: NOT AT ALL
GAD7 TOTAL SCORE: 3
GAD7 TOTAL SCORE: 3
IF YOU CHECKED OFF ANY PROBLEMS ON THIS QUESTIONNAIRE, HOW DIFFICULT HAVE THESE PROBLEMS MADE IT FOR YOU TO DO YOUR WORK, TAKE CARE OF THINGS AT HOME, OR GET ALONG WITH OTHER PEOPLE: NOT DIFFICULT AT ALL
7. FEELING AFRAID AS IF SOMETHING AWFUL MIGHT HAPPEN: SEVERAL DAYS
4. TROUBLE RELAXING: NOT AT ALL

## 2024-05-22 ASSESSMENT — PATIENT HEALTH QUESTIONNAIRE - PHQ9
SUM OF ALL RESPONSES TO PHQ QUESTIONS 1-9: 5
SUM OF ALL RESPONSES TO PHQ QUESTIONS 1-9: 5
10. IF YOU CHECKED OFF ANY PROBLEMS, HOW DIFFICULT HAVE THESE PROBLEMS MADE IT FOR YOU TO DO YOUR WORK, TAKE CARE OF THINGS AT HOME, OR GET ALONG WITH OTHER PEOPLE: VERY DIFFICULT

## 2024-05-22 NOTE — PROGRESS NOTES
"Lynnette is a 33 year old who is being evaluated via a billable video visit.    How would you like to obtain your AVS? MyChart  If the video visit is dropped, the invitation should be resent by: Text to cell phone: [phone]  Will anyone else be joining your video visit? No      Assessment & Plan     Family history of cardiomyopathy  Pt with heart palpitations -   Also strong family hx of cardiomyopathy - 2 of 3 brothers and mother  Will check ECHO  Plan to monitor with zio patch   - Echocardiogram Complete; Future  - ZIO PATCH MAIL OUT; Future    Heart palpitations   As above   - Echocardiogram Complete; Future  - ZIO PATCH MAIL OUT; Future  - TSH with free T4 reflex; Future    Grief reaction  Was on 15mg for anxiety and helping but concerned it was contributing to above issues -   Will refill 10mg for now  Consider decrease dsoe in 4-6 weeks down to 7.5mg or 5mg   - escitalopram (LEXAPRO) 10 MG tablet; Take 1 tablet (10 mg) by mouth daily    Current mild episode of major depressive disorder without prior episode (H24)   As above   - escitalopram (LEXAPRO) 10 MG tablet; Take 1 tablet (10 mg) by mouth daily    Migraine without aura and without status migrainosus, not intractable  Working with neurology           BMI  Estimated body mass index is 25.13 kg/m  as calculated from the following:    Height as of 11/22/23: 1.598 m (5' 2.9\").    Weight as of 11/22/23: 64.1 kg (141 lb 6.4 oz).             Subjective   Lynnette is a 33 year old, presenting for the following health issues:  Depression and Anxiety        5/22/2024    11:09 AM   Additional Questions   Roomed by [employer] Start Time: 12:09 PM    History of Present Illness       Mental Health Follow-up:  Patient presents to follow-up on Depression & Anxiety.Patient's depression since last visit has been:  Good  The patient is having other symptoms associated with depression.  Patient's anxiety since last visit has been:  Better  The patient is having other " "symptoms associated with anxiety.  Any significant life events: job concerns, grief or loss and health concerns  Patient is not feeling anxious or having panic attacks.  Patient has no concerns about alcohol or drug use.    Headaches:   Since the patient's last clinic visit, headaches are: worsened  The patient is getting headaches:  Multiple times a week to daily  She is not able to do normal daily activities when she has a migraine.  The patient is taking the following rescue/relief medications:  Ibuprofen (Advil, Motrin)   Patient states \"The relief is inconsistent\" from the rescue/relief medications.   The patient is taking the following medications to prevent migraines:  Other  In the past 4 weeks, the patient has gone to an Urgent Care or Emergency Room 0 times times due to headaches.    Reason for visit:  Primarily to determine dosage / should continue lexapro. updating information about eye pain / migraine / fatigue.    She eats 2-3 servings of fruits and vegetables daily.She consumes 1 sweetened beverage(s) daily.She exercises with enough effort to increase her heart rate 10 to 19 minutes per day.  She exercises with enough effort to increase her heart rate 5 days per week. She is missing 1 dose(s) of medications per week.  She is not taking prescribed medications regularly due to remembering to take and other.       Discuss about effects of dosage change from last visit (11/22/23) - Notices irregular heartbeat (More noticeable when laying down/bad posture and more vivid/intense dreams     Social History     Tobacco Use    Smoking status: Never     Passive exposure: Never    Smokeless tobacco: Never   Vaping Use    Vaping status: Never Used   Substance Use Topics    Alcohol use: Yes     Comment: 1-2 drinks per week    Drug use: Never         10/6/2023     1:48 PM 11/22/2023    11:08 AM 5/22/2024    11:21 AM   PHQ   PHQ-9 Total Score 17 11 5   Q9: Thoughts of better off dead/self-harm past 2 weeks Not at all " Not at all Not at all         4/28/2023    11:51 AM 11/22/2023    11:10 AM 5/22/2024    11:28 AM   ROSA ELENA-7 SCORE   Total Score  8 (mild anxiety) 3 (minimal anxiety)   Total Score 3 8 3         5/22/2024    11:21 AM   Last PHQ-9   1.  Little interest or pleasure in doing things 1   2.  Feeling down, depressed, or hopeless 1   3.  Trouble falling or staying asleep, or sleeping too much 0   4.  Feeling tired or having little energy 2   5.  Poor appetite or overeating 0   6.  Feeling bad about yourself 0   7.  Trouble concentrating 1   8.  Moving slowly or restless 0   Q9: Thoughts of better off dead/self-harm past 2 weeks 0   PHQ-9 Total Score 5         5/22/2024    11:28 AM   ROSA ELENA-7    1. Feeling nervous, anxious, or on edge 0   2. Not being able to stop or control worrying 1   3. Worrying too much about different things 1   4. Trouble relaxing 0   5. Being so restless that it is hard to sit still 0   6. Becoming easily annoyed or irritable 0   7. Feeling afraid, as if something awful might happen 1   ROSA ELENA-7 Total Score 3   If you checked any problems, how difficult have they made it for you to do your work, take care of things at home, or get along with other people? Not difficult at all       At last visit dose of lexapro increased from 10mg to 15mg - started that dose in January 2024  It is helpful with anxiety   Noticed that dreams are more vivid and hard to wake up in AM - taking at night before bedtime.    Started to notice that when she lies down feels like heart beat is slightly more irregular - will feels having some premature beats   Notices more at night and when she has bad posture  Symptoms first noticed in January -     Last week did decrease dose slightly back down to 10mg because of her potential side effects  Since going down on  dose the dreams better but the heart persisting    Family history of cardiomyopathy  Patient had EKG as workup - ?10 years ago  Mom and 2 of 3 brothers has  cardiomyopathy      Suicide Assessment Five-step Evaluation and Treatment (SAFE-T)                Objective           Vitals:  No vitals were obtained today due to virtual visit.    Physical Exam   GENERAL: alert and no distress  EYES: Eyes grossly normal to inspection.  No discharge or erythema, or obvious scleral/conjunctival abnormalities.  RESP: No audible wheeze, cough, or visible cyanosis.    SKIN: Visible skin clear. No significant rash, abnormal pigmentation or lesions.  NEURO: Cranial nerves grossly intact.  Mentation and speech appropriate for age.  PSYCH: Appropriate affect, tone, and pace of words          Video-Visit Details    Type of service:  Video Visit   Video End Time: 12:34  Originating Location (pt. Location): Home    Distant Location (provider location):  On-site  Platform used for Video Visit: Nisha  Signed Electronically by: Annalisa Beck DO

## 2024-05-28 ENCOUNTER — OFFICE VISIT (OUTPATIENT)
Dept: URGENT CARE | Facility: URGENT CARE | Age: 34
End: 2024-05-28
Payer: COMMERCIAL

## 2024-05-28 VITALS
TEMPERATURE: 97.8 F | OXYGEN SATURATION: 97 % | HEART RATE: 55 BPM | RESPIRATION RATE: 16 BRPM | HEIGHT: 62 IN | DIASTOLIC BLOOD PRESSURE: 77 MMHG | WEIGHT: 150 LBS | BODY MASS INDEX: 27.6 KG/M2 | SYSTOLIC BLOOD PRESSURE: 114 MMHG

## 2024-05-28 DIAGNOSIS — B37.31 YEAST INFECTION OF THE VAGINA: ICD-10-CM

## 2024-05-28 DIAGNOSIS — R10.2 PELVIC PAIN IN FEMALE: Primary | ICD-10-CM

## 2024-05-28 DIAGNOSIS — R30.0 DYSURIA: ICD-10-CM

## 2024-05-28 LAB
ALBUMIN UR-MCNC: NEGATIVE MG/DL
APPEARANCE UR: CLEAR
BASOPHILS # BLD AUTO: 0 10E3/UL (ref 0–0.2)
BASOPHILS NFR BLD AUTO: 1 %
BILIRUB UR QL STRIP: NEGATIVE
CLUE CELLS: ABNORMAL
COLOR UR AUTO: COLORLESS
EOSINOPHIL # BLD AUTO: 0.2 10E3/UL (ref 0–0.7)
EOSINOPHIL NFR BLD AUTO: 3 %
ERYTHROCYTE [DISTWIDTH] IN BLOOD BY AUTOMATED COUNT: 11.9 % (ref 10–15)
GLUCOSE UR STRIP-MCNC: NEGATIVE MG/DL
HCG UR QL: NEGATIVE
HCT VFR BLD AUTO: 44.1 % (ref 35–47)
HGB BLD-MCNC: 14.9 G/DL (ref 11.7–15.7)
HGB UR QL STRIP: NEGATIVE
IMM GRANULOCYTES # BLD: 0 10E3/UL
IMM GRANULOCYTES NFR BLD: 0 %
KETONES UR STRIP-MCNC: NEGATIVE MG/DL
LEUKOCYTE ESTERASE UR QL STRIP: NEGATIVE
LYMPHOCYTES # BLD AUTO: 2.5 10E3/UL (ref 0.8–5.3)
LYMPHOCYTES NFR BLD AUTO: 30 %
MCH RBC QN AUTO: 30.2 PG (ref 26.5–33)
MCHC RBC AUTO-ENTMCNC: 33.8 G/DL (ref 31.5–36.5)
MCV RBC AUTO: 89 FL (ref 78–100)
MONOCYTES # BLD AUTO: 0.7 10E3/UL (ref 0–1.3)
MONOCYTES NFR BLD AUTO: 8 %
NEUTROPHILS # BLD AUTO: 4.8 10E3/UL (ref 1.6–8.3)
NEUTROPHILS NFR BLD AUTO: 58 %
NITRATE UR QL: NEGATIVE
PH UR STRIP: 5.5 [PH] (ref 5–7)
PLATELET # BLD AUTO: 273 10E3/UL (ref 150–450)
RBC # BLD AUTO: 4.94 10E6/UL (ref 3.8–5.2)
RBC #/AREA URNS AUTO: ABNORMAL /HPF
SP GR UR STRIP: <=1.005 (ref 1–1.03)
SQUAMOUS #/AREA URNS AUTO: ABNORMAL /LPF
TRICHOMONAS, WET PREP: ABNORMAL
UROBILINOGEN UR STRIP-ACNC: 0.2 E.U./DL
WBC # BLD AUTO: 8.3 10E3/UL (ref 4–11)
WBC #/AREA URNS AUTO: ABNORMAL /HPF
WBC'S/HIGH POWER FIELD, WET PREP: ABNORMAL
YEAST, WET PREP: PRESENT

## 2024-05-28 PROCEDURE — 99214 OFFICE O/P EST MOD 30 MIN: CPT | Performed by: NURSE PRACTITIONER

## 2024-05-28 PROCEDURE — 85025 COMPLETE CBC W/AUTO DIFF WBC: CPT | Performed by: NURSE PRACTITIONER

## 2024-05-28 PROCEDURE — 81001 URINALYSIS AUTO W/SCOPE: CPT | Performed by: NURSE PRACTITIONER

## 2024-05-28 PROCEDURE — 36415 COLL VENOUS BLD VENIPUNCTURE: CPT | Performed by: NURSE PRACTITIONER

## 2024-05-28 PROCEDURE — 87210 SMEAR WET MOUNT SALINE/INK: CPT | Performed by: NURSE PRACTITIONER

## 2024-05-28 PROCEDURE — 81025 URINE PREGNANCY TEST: CPT | Performed by: NURSE PRACTITIONER

## 2024-05-28 RX ORDER — FLUCONAZOLE 150 MG/1
150 TABLET ORAL
Qty: 2 TABLET | Refills: 0 | Status: SHIPPED | OUTPATIENT
Start: 2024-05-30 | End: 2024-06-04

## 2024-05-28 NOTE — PATIENT INSTRUCTIONS
Pelvic pain with IUD  Differentials include ovarian cyst, endometriosis, polyp, fibroid, PID  CBC and wet prep pending  Urine is normal  Negative for ectopic and patient can feel IUD strings  Shared decision to hold on pelvic US today, follow-up with primary care provider  ER if severe worsening pain, bleeding, fevers, nausea, vomiting

## 2024-05-28 NOTE — PROGRESS NOTES
"Chief Complaint   Patient presents with    Urinary Problem     Frequent need to urinate   Had spotting x3 days ago     Abdominal Pain     Pain in lower right abdomen going down to pelvic area      SUBJECTIVE:  Lynnette Vicente is a 33 year old female presenting with pelvic cramping and pain with sex menstrual spotting urinary frequency urgency clitoris tenderness over the past few days.  On Saturday following sexual intercourse with her monogamous partner she developed generalized lower pelvic cramping.  This pain is rated a 2/10.  She has an IUD which was placed about 6 months ago.  Can feel the strings. No concerns for STDs. Declines fever sweats chills nausea vomiting flank pain.  Had a normal bowel movement yesterday.  Has had a pelvic ultrasound previously which was unremarkable.  No relevant past medical history of urogenital disorders.    Past Medical History:   Diagnosis Date    Migraines     Rectal bleeding      Current Outpatient Medications   Medication Sig Dispense Refill    escitalopram (LEXAPRO) 10 MG tablet Take 1 tablet (10 mg) by mouth daily 90 tablet 1    [START ON 5/30/2024] fluconazole (DIFLUCAN) 150 MG tablet Take 1 tablet (150 mg) by mouth twice a week for 2 doses 2 tablet 0    levonorgestrel (MIRENA) 20 MCG/24HR IUD 1 each by Intrauterine route once       No current facility-administered medications for this visit.     Social History     Tobacco Use    Smoking status: Never     Passive exposure: Never    Smokeless tobacco: Never   Substance Use Topics    Alcohol use: Yes     Comment: 1-2 drinks per week     Allergies   Allergen Reactions    No Clinical Screening - See Comments      Unknown name of anesthesia used for dental procedure    Ivp Dye [Contrast Dye]      Review of Systems   All systems negative except for those listed above in HPI.    OBJECTIVE:   /77   Pulse 55   Temp 97.8  F (36.6  C) (Temporal)   Resp 16   Ht 1.575 m (5' 2\")   Wt 68 kg (150 lb)   LMP  (LMP Unknown)   " SpO2 97%   BMI 27.44 kg/m      Physical Exam  Vitals reviewed.   Constitutional:       Appearance: Normal appearance.   HENT:      Head: Normocephalic and atraumatic.   Cardiovascular:      Rate and Rhythm: Normal rate.   Pulmonary:      Effort: Pulmonary effort is normal.   Abdominal:      General: Abdomen is flat. Bowel sounds are normal. There is no distension.      Palpations: Abdomen is soft.      Tenderness: There is no abdominal tenderness. There is no right CVA tenderness, left CVA tenderness or guarding.   Musculoskeletal:         General: Normal range of motion.   Skin:     General: Skin is warm and dry.      Findings: No rash.   Neurological:      General: No focal deficit present.      Mental Status: She is alert and oriented to person, place, and time.   Psychiatric:         Mood and Affect: Mood normal.         Behavior: Behavior normal.       Results for orders placed or performed in visit on 05/28/24   UA Macroscopic with reflex to Microscopic and Culture - Clinic Collect     Status: Normal    Specimen: Urine, Clean Catch   Result Value Ref Range    Color Urine Colorless Colorless, Straw, Light Yellow, Yellow    Appearance Urine Clear Clear    Glucose Urine Negative Negative mg/dL    Bilirubin Urine Negative Negative    Ketones Urine Negative Negative mg/dL    Specific Gravity Urine <=1.005 1.003 - 1.035    Blood Urine Negative Negative    pH Urine 5.5 5.0 - 7.0    Protein Albumin Urine Negative Negative mg/dL    Urobilinogen Urine 0.2 0.2, 1.0 E.U./dL    Nitrite Urine Negative Negative    Leukocyte Esterase Urine Negative Negative    Narrative    Microscopic not indicated   UA Microscopic with Reflex to Culture     Status: Abnormal   Result Value Ref Range    RBC Urine None Seen 0-2 /HPF /HPF    WBC Urine None Seen 0-5 /HPF /HPF    Squamous Epithelials Urine Few (A) None Seen /LPF    Narrative    Urine Culture not indicated   HCG qualitative urine     Status: Normal   Result Value Ref Range    hCG  Urine Qualitative Negative Negative   CBC with platelets and differential     Status: None   Result Value Ref Range    WBC Count 8.3 4.0 - 11.0 10e3/uL    RBC Count 4.94 3.80 - 5.20 10e6/uL    Hemoglobin 14.9 11.7 - 15.7 g/dL    Hematocrit 44.1 35.0 - 47.0 %    MCV 89 78 - 100 fL    MCH 30.2 26.5 - 33.0 pg    MCHC 33.8 31.5 - 36.5 g/dL    RDW 11.9 10.0 - 15.0 %    Platelet Count 273 150 - 450 10e3/uL    % Neutrophils 58 %    % Lymphocytes 30 %    % Monocytes 8 %    % Eosinophils 3 %    % Basophils 1 %    % Immature Granulocytes 0 %    Absolute Neutrophils 4.8 1.6 - 8.3 10e3/uL    Absolute Lymphocytes 2.5 0.8 - 5.3 10e3/uL    Absolute Monocytes 0.7 0.0 - 1.3 10e3/uL    Absolute Eosinophils 0.2 0.0 - 0.7 10e3/uL    Absolute Basophils 0.0 0.0 - 0.2 10e3/uL    Absolute Immature Granulocytes 0.0 <=0.4 10e3/uL   Wet prep - Clinic Collect     Status: Abnormal    Specimen: Vagina; Swab   Result Value Ref Range    Trichomonas Absent Absent    Yeast Present (A) Absent    Clue Cells Absent Absent    WBCs/high power field 1+ (A) None   CBC with platelets and differential     Status: None    Narrative    The following orders were created for panel order CBC with platelets and differential.  Procedure                               Abnormality         Status                     ---------                               -----------         ------                     CBC with platelets and d...[[id number]]                      Final result                 Please view results for these tests on the individual orders.     ASSESSMENT:    ICD-10-CM    1. Pelvic pain in female  R10.2 CBC with platelets and differential     Wet prep - Clinic Collect     CBC with platelets and differential      2. Dysuria  R30.0 UA Macroscopic with reflex to Microscopic and Culture - Clinic Collect     HCG qualitative urine     UA Microscopic with Reflex to Culture     HCG qualitative urine      3. Yeast infection of the vagina  B37.31 fluconazole (DIFLUCAN)  150 MG tablet        PLAN:     Diflucan for yeast  Pelvic pain with IUD  Differentials include ovarian cyst, endometriosis, polyp, fibroid, PID  CBC and wet prep pending  Urine is normal  Negative for ectopic and patient can feel IUD strings so less likely displaced  Shared decision to hold on pelvic US today, follow-up with primary care provider  ER if severe worsening pain, bleeding, fevers, nausea, vomiting    Follow up with primary care provider with any problems, questions or concerns or if symptoms worsen or fail to improve. Patient agreed to plan and verbalized understanding.    Jennifer Dallas, [facility] [place]

## 2024-06-09 PROCEDURE — 93244 EXT ECG>48HR<7D REV&INTERPJ: CPT | Performed by: INTERNAL MEDICINE

## 2024-06-10 ENCOUNTER — LAB (OUTPATIENT)
Dept: LAB | Facility: CLINIC | Age: 34
End: 2024-06-10
Payer: COMMERCIAL

## 2024-06-10 DIAGNOSIS — R00.2 HEART PALPITATIONS: ICD-10-CM

## 2024-06-10 LAB — TSH SERPL DL<=0.005 MIU/L-ACNC: 2.09 UIU/ML (ref 0.3–4.2)

## 2024-06-10 PROCEDURE — 84443 ASSAY THYROID STIM HORMONE: CPT

## 2024-06-10 PROCEDURE — 36415 COLL VENOUS BLD VENIPUNCTURE: CPT

## 2024-06-11 ENCOUNTER — HOSPITAL ENCOUNTER (OUTPATIENT)
Dept: CARDIOLOGY | Facility: CLINIC | Age: 34
Discharge: HOME | End: 2024-06-11
Attending: FAMILY MEDICINE | Admitting: FAMILY MEDICINE
Payer: COMMERCIAL

## 2024-06-11 DIAGNOSIS — Z82.49 FAMILY HISTORY OF CARDIOMYOPATHY: ICD-10-CM

## 2024-06-11 DIAGNOSIS — R00.2 HEART PALPITATIONS: ICD-10-CM

## 2024-06-11 LAB — LVEF ECHO: NORMAL

## 2024-06-11 PROCEDURE — 93306 TTE W/DOPPLER COMPLETE: CPT

## 2024-06-11 PROCEDURE — 93306 TTE W/DOPPLER COMPLETE: CPT | Mod: 26 | Performed by: INTERNAL MEDICINE

## 2024-06-11 NOTE — RESULT ENCOUNTER NOTE
Dear Lynnette,   Your test results are all back -   Good news - the ziopatch showed normal sinus rhythm.  You occasionally had a few extra beats but very rare and did not correlate with your symptoms.    Let us know if you have any questions.  -Annalisa Beck, DO

## 2024-06-11 NOTE — RESULT ENCOUNTER NOTE
Dear Lynnette,   Your test results are all back -   ECHO shows good amount of heart being pumped.  One valve - the mitral valve has a small amount of prolapse while pumping.  This doesn't typically cause issues but I would like to recheck another ECHO in 1 year to see if it is stable.    Let us know if you have any questions.  -Annalisa Beck, DO

## 2024-06-11 NOTE — RESULT ENCOUNTER NOTE
Dear Lynnette,   Your test results are all back -   -TSH (thyroid stimulating hormone) level is normal which indicates normal thyroid function.  Let us know if you have any questions.  -Annalisa Beck, DO

## 2024-06-13 ENCOUNTER — MYC MEDICAL ADVICE (OUTPATIENT)
Dept: FAMILY MEDICINE | Facility: CLINIC | Age: 34
End: 2024-06-13
Payer: COMMERCIAL

## 2024-06-13 ENCOUNTER — TRANSFERRED RECORDS (OUTPATIENT)
Dept: HEALTH INFORMATION MANAGEMENT | Facility: CLINIC | Age: 34
End: 2024-06-13
Payer: COMMERCIAL

## 2024-08-15 ENCOUNTER — OFFICE VISIT (OUTPATIENT)
Dept: URGENT CARE | Facility: URGENT CARE | Age: 34
End: 2024-08-15
Payer: COMMERCIAL

## 2024-08-15 VITALS
DIASTOLIC BLOOD PRESSURE: 70 MMHG | WEIGHT: 148 LBS | HEART RATE: 62 BPM | OXYGEN SATURATION: 96 % | BODY MASS INDEX: 27.07 KG/M2 | TEMPERATURE: 97.8 F | RESPIRATION RATE: 16 BRPM | SYSTOLIC BLOOD PRESSURE: 109 MMHG

## 2024-08-15 DIAGNOSIS — H61.22 IMPACTED CERUMEN OF LEFT EAR: ICD-10-CM

## 2024-08-15 DIAGNOSIS — H66.91 RIGHT ACUTE OTITIS MEDIA: Primary | ICD-10-CM

## 2024-08-15 PROCEDURE — 99213 OFFICE O/P EST LOW 20 MIN: CPT | Mod: 25 | Performed by: STUDENT IN AN ORGANIZED HEALTH CARE EDUCATION/TRAINING PROGRAM

## 2024-08-15 PROCEDURE — 69210 REMOVE IMPACTED EAR WAX UNI: CPT | Mod: LT | Performed by: STUDENT IN AN ORGANIZED HEALTH CARE EDUCATION/TRAINING PROGRAM

## 2024-08-15 RX ORDER — IBUPROFEN 200 MG
200 TABLET ORAL EVERY 4 HOURS PRN
COMMUNITY

## 2024-08-15 NOTE — PROGRESS NOTES
ASSESSMENT & PLAN:   Diagnoses and all orders for this visit:  Right acute otitis media  -     amoxicillin-clavulanate (AUGMENTIN) 875-125 MG tablet; Take 1 tablet by mouth 2 times daily for 7 days  Impacted cerumen of left ear  -     REMOVE IMPACTED CERUMEN    Right ear plugged/pain x 1 week. Has right AOM and very mild otitis externa on right. Augmentin x 7 days to cover for both. Impacted cerumen of left ear was removed with lighted curette without complication.    At the end of the encounter, I discussed results, diagnosis, medications. Discussed red flags for immediate return to clinic/ER, as well as indications for follow up if no improvement. Patient and/or caregiver understood and agreed to plan. Patient was stable for discharge.    There are no Patient Instructions on file for this visit.    No follow-ups on file.    ------------------------------------------------------------------------  SUBJECTIVE  History was obtained from patient.    Patient presents with:  Urgent Care  Ear Problem: Ear pain started last Friday.    HPI  Lynnette Vicente is a(n) 33 year old female presenting to urgent care for right ear feeling plugged x 1 week. Having intermittent pain. She had cold/allergy symptoms that lasted for a day last week and have resolved. She did Debrox drops x 2 at home with no improvement.     Review of Systems    Current Outpatient Medications   Medication Sig Dispense Refill    amoxicillin-clavulanate (AUGMENTIN) 875-125 MG tablet Take 1 tablet by mouth 2 times daily for 7 days 14 tablet 0    ibuprofen (ADVIL/MOTRIN) 200 MG tablet Take 200 mg by mouth every 4 hours as needed for pain      escitalopram (LEXAPRO) 10 MG tablet Take 1 tablet (10 mg) by mouth daily 90 tablet 1    levonorgestrel (MIRENA) 20 MCG/24HR IUD 1 each by Intrauterine route once       Problem List:  2024-05: Family history of cardiomyopathy  2023-04: Grief reaction  2023-04: Current mild episode of major depressive disorder without    prior episode (H24)  2022-06: IUD (intrauterine device) in place  2022-06: Ligament laxity  2022-02: Migraine without aura and without status migrainosus, not   intractable  2012-08: Hemorrhage of rectum and anus    Allergies   Allergen Reactions    No Clinical Screening - See Comments      Unknown name of anesthesia used for dental procedure    Ivp Dye [Contrast Dye]          OBJECTIVE  Vitals:    08/15/24 1434   BP: 109/70   Pulse: 62   Resp: 16   Temp: 97.8  F (36.6  C)   TempSrc: Tympanic   SpO2: 96%   Weight: 67.1 kg (148 lb)     Physical Exam   GENERAL: healthy, alert, no acute distress.   PSYCH: mentation appears normal. Normal affect  HEAD: normocephalic, atraumatic.  EYE: PERRL. EOMs intact. No scleral injection bilaterally.   EAR: external ear normal. Left ear with impacted cerumen. After removal, left ear canal normal and nonpainful. Right ear canal slightly edematous and tender with otoscope exam, no drainage. Right TM bulging and erythematous. Left TM intact, pearly, translucent without bulging.  LUNGS: no increased work of breathing.     No results found for any visits on 08/15/24.

## 2024-08-15 NOTE — NURSING NOTE
Patient identified using two patient identifiers.  Ear exam showing wax occlusion completed by provider.  Solution: warm water was placed in the bilateral ear(s) via irrigation tool: elephant ear  Joan Ann CMA  '.

## 2024-08-27 ENCOUNTER — TELEPHONE (OUTPATIENT)
Dept: FAMILY MEDICINE | Facility: CLINIC | Age: 34
End: 2024-08-27
Payer: COMMERCIAL

## 2024-08-27 NOTE — TELEPHONE ENCOUNTER
Scheduled appt for tomorrow for ear recheck as patient repots ongoing ear pain after completing abx last week for otitis media.    Sweetie Carlton RN

## 2024-08-28 ENCOUNTER — OFFICE VISIT (OUTPATIENT)
Dept: FAMILY MEDICINE | Facility: CLINIC | Age: 34
End: 2024-08-28
Payer: COMMERCIAL

## 2024-08-28 VITALS
WEIGHT: 148 LBS | SYSTOLIC BLOOD PRESSURE: 108 MMHG | TEMPERATURE: 97.5 F | DIASTOLIC BLOOD PRESSURE: 72 MMHG | OXYGEN SATURATION: 95 % | HEIGHT: 63 IN | BODY MASS INDEX: 26.22 KG/M2 | RESPIRATION RATE: 18 BRPM | HEART RATE: 60 BPM

## 2024-08-28 DIAGNOSIS — H60.502 ACUTE OTITIS EXTERNA OF LEFT EAR, UNSPECIFIED TYPE: Primary | ICD-10-CM

## 2024-08-28 PROCEDURE — 99213 OFFICE O/P EST LOW 20 MIN: CPT | Performed by: FAMILY MEDICINE

## 2024-08-28 RX ORDER — CIPROFLOXACIN AND DEXAMETHASONE 3; 1 MG/ML; MG/ML
4 SUSPENSION/ DROPS AURICULAR (OTIC) 2 TIMES DAILY
Qty: 7.5 ML | Refills: 0 | Status: SHIPPED | OUTPATIENT
Start: 2024-08-28 | End: 2024-09-07

## 2024-08-28 ASSESSMENT — PATIENT HEALTH QUESTIONNAIRE - PHQ9
SUM OF ALL RESPONSES TO PHQ QUESTIONS 1-9: 5
SUM OF ALL RESPONSES TO PHQ QUESTIONS 1-9: 5
10. IF YOU CHECKED OFF ANY PROBLEMS, HOW DIFFICULT HAVE THESE PROBLEMS MADE IT FOR YOU TO DO YOUR WORK, TAKE CARE OF THINGS AT HOME, OR GET ALONG WITH OTHER PEOPLE: SOMEWHAT DIFFICULT

## 2024-08-28 ASSESSMENT — PAIN SCALES - GENERAL: PAINLEVEL: MODERATE PAIN (4)

## 2024-08-28 ASSESSMENT — ENCOUNTER SYMPTOMS
RHINORRHEA: 1
HEADACHES: 1

## 2024-08-28 NOTE — PATIENT INSTRUCTIONS
ASSESSMENT AND PLAN  1. Acute otitis externa of left ear, unspecified type  Based on exam with ear canal swelling, yellow discharge and yellow drainage.      Treat with ear drops.     Discussed cannot say 100% no tympanic membrane rupture so will have checked at next physical. No hearing change at this time.       - ciprofloxacin-dexAMETHasone (CIPRODEX) 0.3-0.1 % otic suspension; Place 4 drops into the right ear 2 times daily for 10 days.  Dispense: 7.5 mL; Refill: 0

## 2024-08-28 NOTE — PROGRESS NOTES
"  Assessment & Plan     ASSESSMENT AND PLAN  1. Acute otitis externa of left ear, unspecified type after acute otitis media treatment.   Based on exam with ear canal swelling, yellow discharge and yellow drainage and no fevers or significant pain.     Treat with ear drops.     Discussed cannot say 100% no tympanic membrane rupture so will have checked at next physical. No hearing change at this time.       - ciprofloxacin-dexAMETHasone (CIPRODEX) 0.3-0.1 % otic suspension; Place 4 drops into the right ear 2 times daily for 10 days.  Dispense: 7.5 mL; Refill: 0      Follow up November for yearly physical or earlier as needed if no improvement.           BMI  Estimated body mass index is 26.22 kg/m  as calculated from the following:    Height as of this encounter: 1.6 m (5' 3\").    Weight as of this encounter: 67.1 kg (148 lb).             Patrick Church is a 33 year old, presenting for the following health issues:  Ear Problem        8/28/2024     9:05 AM   Additional Questions   Roomed by Ba TELLEZ     History of Present Illness       Reason for visit:  Ear infection, pain, leakage    She eats 4 or more servings of fruits and vegetables daily.She consumes 1 sweetened beverage(s) daily.She exercises with enough effort to increase her heart rate 30 to 60 minutes per day.  She exercises with enough effort to increase her heart rate 3 or less days per week.   She is taking medications regularly.         ED/UC Followup:    Facility:  [facility]   Date of visit: 8/15/2024   Reason for visit: Right acute otitis media   Current Status: Mild improvement, unresolved            Objective    /72   Pulse 60   Temp 97.5  F (36.4  C) (Temporal)   Resp 18   Ht 1.6 m (5' 3\")   Wt 67.1 kg (148 lb)   LMP  (LMP Unknown)   SpO2 95%   BMI 26.22 kg/m    Body mass index is 26.22 kg/m .  Physical Exam   As above in plan.             Signed Electronically by: Joel Daniel Wegener, MD    "

## 2024-09-01 ENCOUNTER — OFFICE VISIT (OUTPATIENT)
Dept: URGENT CARE | Facility: URGENT CARE | Age: 34
End: 2024-09-01
Payer: COMMERCIAL

## 2024-09-01 ENCOUNTER — NURSE TRIAGE (OUTPATIENT)
Dept: NURSING | Facility: CLINIC | Age: 34
End: 2024-09-01
Payer: COMMERCIAL

## 2024-09-01 VITALS
SYSTOLIC BLOOD PRESSURE: 112 MMHG | WEIGHT: 148 LBS | DIASTOLIC BLOOD PRESSURE: 70 MMHG | TEMPERATURE: 98.2 F | BODY MASS INDEX: 27.23 KG/M2 | OXYGEN SATURATION: 98 % | HEART RATE: 61 BPM | HEIGHT: 62 IN

## 2024-09-01 DIAGNOSIS — H60.391 INFECTIVE OTITIS EXTERNA, RIGHT: ICD-10-CM

## 2024-09-01 DIAGNOSIS — H92.01 RIGHT EAR PAIN: Primary | ICD-10-CM

## 2024-09-01 LAB
ERYTHROCYTE [DISTWIDTH] IN BLOOD BY AUTOMATED COUNT: 11.9 % (ref 10–15)
HCT VFR BLD AUTO: 39.4 % (ref 35–47)
HGB BLD-MCNC: 13.3 G/DL (ref 11.7–15.7)
MCH RBC QN AUTO: 30.2 PG (ref 26.5–33)
MCHC RBC AUTO-ENTMCNC: 33.8 G/DL (ref 31.5–36.5)
MCV RBC AUTO: 89 FL (ref 78–100)
PLATELET # BLD AUTO: 240 10E3/UL (ref 150–450)
RBC # BLD AUTO: 4.41 10E6/UL (ref 3.8–5.2)
WBC # BLD AUTO: 10.3 10E3/UL (ref 4–11)

## 2024-09-01 PROCEDURE — 87107 FUNGI IDENTIFICATION MOLD: CPT | Mod: 59 | Performed by: PHYSICIAN ASSISTANT

## 2024-09-01 PROCEDURE — 87070 CULTURE OTHR SPECIMN AEROBIC: CPT | Performed by: PHYSICIAN ASSISTANT

## 2024-09-01 PROCEDURE — 87205 SMEAR GRAM STAIN: CPT | Performed by: PHYSICIAN ASSISTANT

## 2024-09-01 PROCEDURE — 99213 OFFICE O/P EST LOW 20 MIN: CPT | Performed by: PHYSICIAN ASSISTANT

## 2024-09-01 PROCEDURE — 36415 COLL VENOUS BLD VENIPUNCTURE: CPT | Performed by: PHYSICIAN ASSISTANT

## 2024-09-01 PROCEDURE — 87186 SC STD MICRODIL/AGAR DIL: CPT | Mod: 59 | Performed by: PHYSICIAN ASSISTANT

## 2024-09-01 PROCEDURE — 87077 CULTURE AEROBIC IDENTIFY: CPT | Mod: 59 | Performed by: PHYSICIAN ASSISTANT

## 2024-09-01 PROCEDURE — 85027 COMPLETE CBC AUTOMATED: CPT | Performed by: PHYSICIAN ASSISTANT

## 2024-09-01 RX ORDER — CIPROFLOXACIN AND DEXAMETHASONE 3; 1 MG/ML; MG/ML
4 SUSPENSION/ DROPS AURICULAR (OTIC) 2 TIMES DAILY
Qty: 7.5 ML | Refills: 0 | Status: SHIPPED | OUTPATIENT
Start: 2024-09-01

## 2024-09-01 RX ORDER — CEFDINIR 300 MG/1
300 CAPSULE ORAL 2 TIMES DAILY
Qty: 14 CAPSULE | Refills: 0 | Status: SHIPPED | OUTPATIENT
Start: 2024-09-01 | End: 2024-09-08

## 2024-09-01 NOTE — PROGRESS NOTES
Assessment & Plan     1. Right ear pain  - CBC with platelets; Future  - CBC with platelets  - Adult ENT  Referral; Future  - Respiratory Aerobic Bacterial Culture with Gram Stain    2. Infective otitis externa, right  Inspection of the ear is consistent with infective otitis externa, but she does have cellulitis of the lobe as well.  Treatment with medication as below.  Will have her extend her Ciprodex for another week.  A culture of the ear drainage was taken.  ENT referral given if she continues to have ear pain for further management.  - ciprofloxacin-dexAMETHasone (CIPRODEX) 0.3-0.1 % otic suspension; Place 4 drops into the right ear 2 times daily.  Dispense: 7.5 mL; Refill: 0  - cefdinir (OMNICEF) 300 MG capsule; Take 1 capsule (300 mg) by mouth 2 times daily for 7 days.  Dispense: 14 capsule; Refill: 0  - Respiratory Aerobic Bacterial Culture with Gram Stain        Return in about 1 week (around 9/8/2024), or if symptoms worsen or fail to improve, for ENT.    Diagnosis and treatment plan was reviewed with patient and/or family.   We went over any labs or imaging. Discussed worsening symptoms or little to no relief despite treatment plan to follow-up with PCP or return to clinic.  Patient verbalizes understanding. All questions were addressed and answered.     Carie Romo PA-C  [facility] [place]    CHIEF COMPLAINT:   Chief Complaint   Patient presents with    Urgent Care    Otalgia     Pt in clinic to have eval for non resolved ear pain.  Pt states she has been treated with ABX without resolve.     Subjective     Lynnette is a 33 year old female who presents to clinic today for evaluation of ear pain.  Started on 8/7/24. She was seen on 8/15/24 treated with augmentin. Symptoms somewhat improved with augmentin.   Seen again on 8/28/24, and thought to be an outer ear infection. She was started on ciprodex ear drops. Was unable to see if TM was ruptured at that time.   Pain  "has been worsening since that time. The whole side of her face is tender, which started in the past couple of days. Hurts to open mouth really wide and chew.   Feels a lot of pressure in her ear.   No fever or chills.       Past Medical History:   Diagnosis Date    Migraines     Rectal bleeding      No past surgical history on file.  Social History     Tobacco Use    Smoking status: Never     Passive exposure: Never    Smokeless tobacco: Never   Substance Use Topics    Alcohol use: Yes     Comment: 1-2 drinks per week     Current Outpatient Medications   Medication Sig Dispense Refill    cefdinir (OMNICEF) 300 MG capsule Take 1 capsule (300 mg) by mouth 2 times daily for 7 days. 14 capsule 0    ciprofloxacin-dexAMETHasone (CIPRODEX) 0.3-0.1 % otic suspension Place 4 drops into the right ear 2 times daily. 7.5 mL 0    ciprofloxacin-dexAMETHasone (CIPRODEX) 0.3-0.1 % otic suspension Place 4 drops into the right ear 2 times daily for 10 days. 7.5 mL 0    escitalopram (LEXAPRO) 10 MG tablet Take 1 tablet (10 mg) by mouth daily 90 tablet 1    ibuprofen (ADVIL/MOTRIN) 200 MG tablet Take 200 mg by mouth every 4 hours as needed for pain      levonorgestrel (MIRENA) 20 MCG/24HR IUD 1 each by Intrauterine route once       No current facility-administered medications for this visit.     Allergies   Allergen Reactions    No Clinical Screening - See Comments      Unknown name of anesthesia used for dental procedure    Ivp Dye [Contrast Dye]        10 point ROS of systems were all negative except for pertinent positives noted in my HPI.      Exam:   /70   Pulse 61   Temp 98.2  F (36.8  C) (Temporal)   Ht 1.575 m (5' 2\")   Wt 67.1 kg (148 lb)   LMP  (LMP Unknown)   SpO2 98%   BMI 27.07 kg/m    Constitutional: healthy, alert and no distress  ENT: R TM is normal. Canal has swelling and erythema. TRagus is erythematous and TTP. nasal mucosa pink and moist, throat without tonsillar hypertrophy or erythema  Neck: neck is " supple, no cervical lymphadenopathy or nuchal rigidity  Cardiovascular: RRR  Respiratory: CTA bilaterally, no rhonchi or rales  Skin: no rashes    Results for orders placed or performed in visit on 09/01/24   CBC with platelets     Status: Normal   Result Value Ref Range    WBC Count 10.3 4.0 - 11.0 10e3/uL    RBC Count 4.41 3.80 - 5.20 10e6/uL    Hemoglobin 13.3 11.7 - 15.7 g/dL    Hematocrit 39.4 35.0 - 47.0 %    MCV 89 78 - 100 fL    MCH 30.2 26.5 - 33.0 pg    MCHC 33.8 31.5 - 36.5 g/dL    RDW 11.9 10.0 - 15.0 %    Platelet Count 240 150 - 450 10e3/uL   Respiratory Aerobic Bacterial Culture with Gram Stain     Status: Abnormal (Preliminary result)    Specimen: Ear, Right; Swab   Result Value Ref Range    Culture No growth, less than 1 day     Gram Stain Result 1+ Gram positive cocci (A)     Gram Stain Result 1+ WBC seen (A)

## 2024-09-01 NOTE — TELEPHONE ENCOUNTER
Nurse Triage SBAR    Situation: ear infection    Background:   -Patient calling  -It is okay to call back and leave a detailed message at this number:       Assessment: Treated two times for right outer ear infection. Pain and swelling is increasing.  Symptoms are not improving. Right facial and neck pain rated 5/10. Pt has taken Ibuprofen which has only provided mild relief. Also has headache.     -no fever    Recommendation: See PCP within 24 hours.   recommended as clinics are closed. Pt stated she will go to [facility] today.     -Plans to follow recommendations  -Call back with and questions, concerns, or any change in symptoms          Reason for Disposition   [1] Taking antibiotic > 72 hours (3 days) and [2] pain persists or recurs    Additional Information   Negative: [1] Stiff neck (can't touch chin to chest) AND [2] fever   Negative: [1] Bony area of skull behind the ear is pink or swollen AND [2] fever   Negative: Fever > 104 F (40 C)   Negative: Patient sounds very sick or weak to the triager   Negative: [1] SEVERE pain and [2] not improved 2 hours after analgesic medication (e.g., ibuprofen or acetaminophen)   Negative: Walking is very unsteady or feels very dizzy   Negative: [1] Vomited AND [2] 3 or more times    Protocols used: Ear - Otitis Media Follow-up Call-A-

## 2024-09-01 NOTE — PATIENT INSTRUCTIONS
Continue with Ciprodex drops for one more week  Start cefdinir two times per day for one week  Ibuprofen/Tylenol for pain  Use warm compresses  Call ENT on Tuesday to make a follow-up appointment

## 2024-09-04 NOTE — RESULT ENCOUNTER NOTE
Prelim results from ear fluid culture, unsure if mixed cristiane or related to active infection:     Stenotrophomonas maltophilia: uncommon, hard to treat, may be incidental, but Bactrim would be recommended (or else levofloxacin)    Acinetobacter baumannii: can do a beta lactam or fluoroquinolone    Aspergillus fumigatus: voriconazole would work    Will await sensitivities before changing antibiotics and should also check on patient symptoms as these could be just opportunistic pathogens and not actively causing infection at this time.     Called patient to discuss and her sx are improving. However, last time this happened she finished abx and then it all came back.     So here's the plan: We will give Lynnette a call when the sensitivities are back and if her sx are worsening or staying the same, we should treat for 1 or all of the above pathogens. If she's doing better but her symptoms come back worse after finishing abx, would treat for 1 or all of the above pathogens.    Alondra Muhammad MD  09/04/24  *You can generally contact me quickly via Epic chat*

## 2024-09-05 LAB
BACTERIA SPEC CULT: ABNORMAL
GRAM STAIN RESULT: ABNORMAL
GRAM STAIN RESULT: ABNORMAL

## 2024-09-09 ENCOUNTER — TELEPHONE (OUTPATIENT)
Dept: FAMILY MEDICINE | Facility: CLINIC | Age: 34
End: 2024-09-09
Payer: COMMERCIAL

## 2024-09-09 NOTE — TELEPHONE ENCOUNTER
"  General Call    Contacts       Contact Date/Time Type Contact Phone/Fax    09/09/2024 03:07 PM CDT Phone (Incoming) Lynnette Vicente (Self) [phone] (M)          Reason for Call:  Calling back regarding symptoms not improving as documented in result note    What are your questions or concerns:      \"So here's the plan: We will give Lynnette a call when the sensitivities are back and if her sx are worsening or staying the same, we should treat for 1 or all of the above pathogens. If she's doing better but her symptoms come back worse after finishing abx, would treat for 1 or all of the above pathogens.     Alondra Muhammad MD  09/04/24  *You can generally contact me quickly via Epic chat*\"    Date of last appointment with provider: 9/1/24    [facility] or would you prefer to receive a phone call?:   Patient would prefer a phone call   Okay to leave a detailed message?: Yes at Home number on file [phone] (home)  "

## 2024-09-10 NOTE — NURSING NOTE
Prior to immunization administration, verified patients identity using patient s name and date of birth. Please see Immunization Activity for additional information.     Screening Questionnaire for Adult Immunization    Are you sick today?   No   Do you have allergies to medications, food, a vaccine component or latex?   No   Have you ever had a serious reaction after receiving a vaccination?   No   Do you have a long-term health problem with heart, lung, kidney, or metabolic disease (e.g., diabetes), asthma, a blood disorder, no spleen, complement component deficiency, a cochlear implant, or a spinal fluid leak?  Are you on long-term aspirin therapy?   No   Do you have cancer, leukemia, HIV/AIDS, or any other immune system problem?   No   Do you have a parent, brother, or sister with an immune system problem?   No   In the past 3 months, have you taken medications that affect  your immune system, such as prednisone, other steroids, or anticancer drugs; drugs for the treatment of rheumatoid arthritis, Crohn s disease, or psoriasis; or have you had radiation treatments?   No   Have you had a seizure, or a brain or other nervous system problem?   No   During the past year, have you received a transfusion of blood or blood    products, or been given immune (gamma) globulin or antiviral drug?   No   For women: Are you pregnant or is there a chance you could become       pregnant during the next month?   Don't Know   Have you received any vaccinations in the past 4 weeks?   Yes     Immunization questionnaire was positive for at least one answer.  Notified.     I have reviewed the following standing orders: Not Applicable; Order were already placed prior to ancillary visit    Patient instructed to remain in clinic for 15 minutes afterwards, and to report any adverse reactions.     Screening performed by Maru Metz CMA on 10/18/2023 at 4:24 PM.        Received a fax in response to PA:    A prior auth is not required: the request for the medication has been previously approved and a current authorization exists.  Our records indicate the patient filled the requested medication on 9/3/24. It is too soon to refill until 9/27/24.

## 2024-09-11 NOTE — TELEPHONE ENCOUNTER
So far, Flouroquinolones should cover for both pathogens where the sensitivities are listed.    Carie Romo PA-C

## 2024-09-12 NOTE — TELEPHONE ENCOUNTER
Pt states she is finally feeling better today than she has but developed pain in front of ear, wondering if she should be seen for this. This developed a few days ago.  Joan Ann, CMA

## 2024-09-13 NOTE — TELEPHONE ENCOUNTER
If it is just pain, she can monitor it and take tylenol / ibuprofen. If swelling, rash, erythema etc. Advise follow-up.    Carie Romo PA-C

## 2024-10-10 ENCOUNTER — OFFICE VISIT (OUTPATIENT)
Dept: FAMILY MEDICINE | Facility: CLINIC | Age: 34
End: 2024-10-10
Payer: COMMERCIAL

## 2024-10-10 VITALS
HEIGHT: 62 IN | TEMPERATURE: 98.1 F | WEIGHT: 144 LBS | SYSTOLIC BLOOD PRESSURE: 110 MMHG | BODY MASS INDEX: 26.5 KG/M2 | DIASTOLIC BLOOD PRESSURE: 60 MMHG | OXYGEN SATURATION: 98 % | HEART RATE: 73 BPM

## 2024-10-10 DIAGNOSIS — M25.562 CHRONIC PAIN OF BOTH KNEES: Primary | ICD-10-CM

## 2024-10-10 DIAGNOSIS — G89.29 CHRONIC PAIN OF BOTH KNEES: Primary | ICD-10-CM

## 2024-10-10 DIAGNOSIS — M25.561 CHRONIC PAIN OF BOTH KNEES: Primary | ICD-10-CM

## 2024-10-10 PROCEDURE — 99213 OFFICE O/P EST LOW 20 MIN: CPT | Performed by: PHYSICIAN ASSISTANT

## 2024-10-10 ASSESSMENT — PATIENT HEALTH QUESTIONNAIRE - PHQ9
SUM OF ALL RESPONSES TO PHQ QUESTIONS 1-9: 8
SUM OF ALL RESPONSES TO PHQ QUESTIONS 1-9: 8
10. IF YOU CHECKED OFF ANY PROBLEMS, HOW DIFFICULT HAVE THESE PROBLEMS MADE IT FOR YOU TO DO YOUR WORK, TAKE CARE OF THINGS AT HOME, OR GET ALONG WITH OTHER PEOPLE: VERY DIFFICULT

## 2024-10-10 ASSESSMENT — ANXIETY QUESTIONNAIRES
GAD7 TOTAL SCORE: 6
GAD7 TOTAL SCORE: 6
7. FEELING AFRAID AS IF SOMETHING AWFUL MIGHT HAPPEN: SEVERAL DAYS
3. WORRYING TOO MUCH ABOUT DIFFERENT THINGS: MORE THAN HALF THE DAYS
5. BEING SO RESTLESS THAT IT IS HARD TO SIT STILL: NOT AT ALL
2. NOT BEING ABLE TO STOP OR CONTROL WORRYING: SEVERAL DAYS
4. TROUBLE RELAXING: SEVERAL DAYS
6. BECOMING EASILY ANNOYED OR IRRITABLE: NOT AT ALL
8. IF YOU CHECKED OFF ANY PROBLEMS, HOW DIFFICULT HAVE THESE MADE IT FOR YOU TO DO YOUR WORK, TAKE CARE OF THINGS AT HOME, OR GET ALONG WITH OTHER PEOPLE?: SOMEWHAT DIFFICULT
7. FEELING AFRAID AS IF SOMETHING AWFUL MIGHT HAPPEN: SEVERAL DAYS
1. FEELING NERVOUS, ANXIOUS, OR ON EDGE: SEVERAL DAYS
IF YOU CHECKED OFF ANY PROBLEMS ON THIS QUESTIONNAIRE, HOW DIFFICULT HAVE THESE PROBLEMS MADE IT FOR YOU TO DO YOUR WORK, TAKE CARE OF THINGS AT HOME, OR GET ALONG WITH OTHER PEOPLE: SOMEWHAT DIFFICULT
GAD7 TOTAL SCORE: 6

## 2024-10-10 ASSESSMENT — PAIN SCALES - PAIN ENJOYMENT GENERAL ACTIVITY SCALE (PEG)
INTERFERED_ENJOYMENT_LIFE: 6
INTERFERED_GENERAL_ACTIVITY: 7
AVG_PAIN_PASTWEEK: 3
INTERFERED_ENJOYMENT_LIFE: 6
PEG_TOTALSCORE: 5.33
INTERFERED_GENERAL_ACTIVITY: 7
AVG_PAIN_PASTWEEK: 3
PEG_TOTALSCORE: 5.33

## 2024-10-10 ASSESSMENT — PAIN SCALES - GENERAL: PAINLEVEL: MODERATE PAIN (4)

## 2024-10-10 NOTE — PROGRESS NOTES
"Assessment & Plan     Chronic pain of both knees    - Physical Therapy  Referral; Future          BMI  Estimated body mass index is 26.34 kg/m  as calculated from the following:    Height as of this encounter: 1.575 m (5' 2\").    Weight as of this encounter: 65.3 kg (144 lb).             Patrick Church is a 33 year old, presenting for the following health issues:  Knee Pain        10/10/2024    10:00 AM   Additional Questions   Roomed by Bianca VEGA MA apprentice   Accompanied by n/a     History of Present Illness       Reason for visit:  Knee pain  Symptom onset:  More than a month  Symptoms include:  Knee pain  Symptom intensity:  Moderate  Symptom progression:  Worsening  Had these symptoms before:  Yes  Has tried/received treatment for these symptoms:  No  What makes it worse:  Activity  What makes it better:  N/a She is missing 1 dose(s) of medications per week.       Answers submitted by the patient for this visit:  Patient Health Questionnaire (Submitted on 10/10/2024)  If you checked off any problems, how difficult have these problems made it for you to do your work, take care of things at home, or get along with other people?: Very difficult  PHQ9 TOTAL SCORE: 8  Patient Health Questionnaire (G7) (Submitted on 10/10/2024)  ROSA ELENA 7 TOTAL SCORE: 6      Pain History:  When did you first notice your pain? On and off for years   Have you seen this provider for your pain in the past? No   Where in your body do your have pain? Bilateral knee, but left knee is currently worse  Are you seeing anyone else for your pain? No, saw someone for hand joint issues but nothing knees  What makes your pain better? N/a  What makes your pain worse? N/a  How has pain affected your ability to work? Not currently working - unrelated to pain  Who lives in your household? Partner        5/22/2024    11:21 AM 8/28/2024     8:59 AM 10/10/2024    10:00 AM   PHQ-9 SCORE   PHQ-9 Total Score Milly 5 (Mild depression) 5 (Mild " "depression) 8 (Mild depression)   PHQ-9 Total Score 5 5 8           11/22/2023    11:10 AM 5/22/2024    11:28 AM 10/10/2024    10:01 AM   ROSA ELENA-7 SCORE   Total Score 8 (mild anxiety) 3 (minimal anxiety) 6 (mild anxiety)   Total Score 8 3 6               10/10/2024    10:12 AM   PEG Score   PEG Total Score 5.33       Chronic Pain - Initial Assessment:    How would you describe your pain? Sharp when walking, and more dull when sitting. No radiation. Believes it is overall joint pain  Have you had any recent changes to the severity or character of your pain? Starting to happen more frequently and lasting longer  Is there an underlying cause that has been identified? Has family history of loose ligaments and knee dislocation  Has your ability to work or do daily activities changed recently because of your pain? Not working right now  Which of these pain treatments have you tried? Other: Ibuprofen, ice  Previous medication treatments:                Review of Systems  Constitutional, HEENT, cardiovascular, pulmonary, gi and gu systems are negative, except as otherwise noted.      Objective    /60 (BP Location: Left arm, Patient Position: Sitting, Cuff Size: Adult Regular)   Pulse 73   Temp 98.1  F (36.7  C) (Temporal)   Ht 1.575 m (5' 2\")   Wt 65.3 kg (144 lb)   LMP  (LMP Unknown)   SpO2 98%   BMI 26.34 kg/m    Body mass index is 26.34 kg/m .  Physical Exam   GENERAL: alert and no distress  EYES: Eyes grossly normal to inspection  MS: full active ROM in b/l knee.  Tender patellar tendon L>R            Signed Electronically by: Ciro Hutchinson PA-C    "

## 2024-10-11 ENCOUNTER — VIRTUAL VISIT (OUTPATIENT)
Dept: FAMILY MEDICINE | Facility: CLINIC | Age: 34
End: 2024-10-11
Payer: COMMERCIAL

## 2024-10-11 DIAGNOSIS — F32.0 CURRENT MILD EPISODE OF MAJOR DEPRESSIVE DISORDER WITHOUT PRIOR EPISODE (H): ICD-10-CM

## 2024-10-11 DIAGNOSIS — F43.21 GRIEF REACTION: ICD-10-CM

## 2024-10-11 DIAGNOSIS — Z12.83 SKIN EXAM, SCREENING FOR CANCER: ICD-10-CM

## 2024-10-11 DIAGNOSIS — I34.1 MITRAL VALVE PROLAPSE: ICD-10-CM

## 2024-10-11 DIAGNOSIS — Z82.49 FAMILY HISTORY OF CARDIOMYOPATHY: Primary | ICD-10-CM

## 2024-10-11 PROCEDURE — 99214 OFFICE O/P EST MOD 30 MIN: CPT | Mod: 95 | Performed by: FAMILY MEDICINE

## 2024-10-11 RX ORDER — ESCITALOPRAM OXALATE 5 MG/1
15 TABLET ORAL DAILY
Qty: 270 TABLET | Refills: 1 | Status: SHIPPED | OUTPATIENT
Start: 2024-10-11 | End: 2024-10-24

## 2024-10-11 NOTE — PROGRESS NOTES
"Lynnette is a 33 year old who is being evaluated via a billable video visit.    How would you like to obtain your AVS? MyChart  If the video visit is dropped, the invitation should be resent by:   Will anyone else be joining your video visit?       Assessment & Plan     Family history of cardiomyopathy  Multiple family members with hx of cardiomyopathy -   Will refer to preventive cardiology to discuss any recommendations   - REFERRAL TO [facility] FOR CARDIOVASCULAR DISEASE PREVN    Mitral valve prolapse  Noted on most recheck ECHO - discussed just rechecking one year -     Current mild episode of major depressive disorder without prior episode (H)  Symptoms better on 15mg - will increase dose from 10g up to 15mg   Plan to follow-up 6 months   - escitalopram (LEXAPRO) 5 MG tablet; Take 3 tablets (15 mg) by mouth daily.    Grief reaction       Skin exam, screening for cancer     - Adult Dermatology  Referral; Future    Pelvic pain with IUD -   Was recommended getting pelvic ultrasound -   She is going to try to get done at [facility] - will reach of to them but if not able will message about ordering pelvic  ultrasound       BMI  Estimated body mass index is 26.34 kg/m  as calculated from the following:    Height as of 10/10/24: 1.575 m (5' 2\").    Weight as of 10/10/24: 65.3 kg (144 lb).                     Subjective   Lynnette is a 33 year old, presenting for the following health issues:  Knee Pain      Video Start Time:  1:40pm    Grief reaction   Was taking lexapro 15mg but at last visit dose was decreased to 10mg   Side effects - not sure if from med or not - if she wakes at night will wake in panic or have some night anxiety     Cardiac -   Had work up at the past     IUD and pelvic pain    Mom with colon               Review of Systems  Constitutional, HEENT, cardiovascular, pulmonary, GI, , musculoskeletal, neuro, skin, endocrine and psych systems are negative, except as otherwise noted.    "   Objective           Vitals:  No vitals were obtained today due to virtual visit.    Physical Exam   GENERAL: alert and no distress  EYES: Eyes grossly normal to inspection.  No discharge or erythema, or obvious scleral/conjunctival abnormalities.  RESP: No audible wheeze, cough, or visible cyanosis.    SKIN: Visible skin clear. No significant rash, abnormal pigmentation or lesions.  NEURO: Cranial nerves grossly intact.  Mentation and speech appropriate for age.  PSYCH: Appropriate affect, tone, and pace of words          Video-Visit Details    Type of service:  Video Visit   Video End Time:2:45 PM  Originating Location (pt. Location): Home    Distant Location (provider location):  On-site  Platform used for Video Visit: Nisha  Signed Electronically by: Annalisa Beck DO

## 2024-10-22 ENCOUNTER — THERAPY VISIT (OUTPATIENT)
Dept: PHYSICAL THERAPY | Facility: CLINIC | Age: 34
End: 2024-10-22
Attending: PHYSICIAN ASSISTANT
Payer: COMMERCIAL

## 2024-10-22 DIAGNOSIS — G89.29 CHRONIC PAIN OF BOTH KNEES: ICD-10-CM

## 2024-10-22 DIAGNOSIS — M25.562 CHRONIC PAIN OF BOTH KNEES: ICD-10-CM

## 2024-10-22 DIAGNOSIS — M25.561 CHRONIC PAIN OF BOTH KNEES: ICD-10-CM

## 2024-10-22 PROCEDURE — 97530 THERAPEUTIC ACTIVITIES: CPT | Mod: GP

## 2024-10-22 PROCEDURE — 97110 THERAPEUTIC EXERCISES: CPT | Mod: GP

## 2024-10-22 PROCEDURE — 97161 PT EVAL LOW COMPLEX 20 MIN: CPT | Mod: GP

## 2024-10-22 ASSESSMENT — ACTIVITIES OF DAILY LIVING (ADL)
RISE FROM A CHAIR: ACTIVITY IS NOT DIFFICULT
KNEE_ACTIVITY_OF_DAILY_LIVING_SUM: 46
WEAKNESS: I HAVE THE SYMPTOM BUT IT DOES NOT AFFECT MY ACTIVITY
LIMPING: I DO NOT HAVE THE SYMPTOM
SWELLING: I DO NOT HAVE THE SYMPTOM
RISE FROM A CHAIR: ACTIVITY IS NOT DIFFICULT
SIT WITH YOUR KNEE BENT: ACTIVITY IS MINIMALLY DIFFICULT
KNEE_ACTIVITY_OF_DAILY_LIVING_SCORE: 65.71
HOW_WOULD_YOU_RATE_THE_OVERALL_FUNCTION_OF_YOUR_KNEE_DURING_YOUR_USUAL_DAILY_ACTIVITIES?: ABNORMAL
STAND: ACTIVITY IS SOMEWHAT DIFFICULT
WALK: ACTIVITY IS SOMEWHAT DIFFICULT
PAIN: THE SYMPTOM AFFECTS MY ACTIVITY MODERATELY
SQUAT: ACTIVITY IS FAIRLY DIFFICULT
SWELLING: I DO NOT HAVE THE SYMPTOM
AS_A_RESULT_OF_YOUR_KNEE_INJURY,_HOW_WOULD_YOU_RATE_YOUR_CURRENT_LEVEL_OF_DAILY_ACTIVITY?: ABNORMAL
STIFFNESS: THE SYMPTOM AFFECTS MY ACTIVITY SLIGHTLY
WALK: ACTIVITY IS SOMEWHAT DIFFICULT
RAW_SCORE: 46
KNEEL ON THE FRONT OF YOUR KNEE: ACTIVITY IS FAIRLY DIFFICULT
LIMPING: I DO NOT HAVE THE SYMPTOM
PAIN: THE SYMPTOM AFFECTS MY ACTIVITY MODERATELY
GO DOWN STAIRS: ACTIVITY IS SOMEWHAT DIFFICULT
GO UP STAIRS: ACTIVITY IS SOMEWHAT DIFFICULT
WEAKNESS: I HAVE THE SYMPTOM BUT IT DOES NOT AFFECT MY ACTIVITY
GIVING WAY, BUCKLING OR SHIFTING OF KNEE: THE SYMPTOM AFFECTS MY ACTIVITY MODERATELY
GIVING WAY, BUCKLING OR SHIFTING OF KNEE: THE SYMPTOM AFFECTS MY ACTIVITY MODERATELY
KNEEL ON THE FRONT OF YOUR KNEE: ACTIVITY IS FAIRLY DIFFICULT
GO UP STAIRS: ACTIVITY IS SOMEWHAT DIFFICULT
HOW_WOULD_YOU_RATE_THE_CURRENT_FUNCTION_OF_YOUR_KNEE_DURING_YOUR_USUAL_DAILY_ACTIVITIES_ON_A_SCALE_FROM_0_TO_100_WITH_100_BEING_YOUR_LEVEL_OF_KNEE_FUNCTION_PRIOR_TO_YOUR_INJURY_AND_0_BEING_THE_INABILITY_TO_PERFORM_ANY_OF_YOUR_USUAL_DAILY_ACTIVITIES?: 75
HOW_WOULD_YOU_RATE_THE_CURRENT_FUNCTION_OF_YOUR_KNEE_DURING_YOUR_USUAL_DAILY_ACTIVITIES_ON_A_SCALE_FROM_0_TO_100_WITH_100_BEING_YOUR_LEVEL_OF_KNEE_FUNCTION_PRIOR_TO_YOUR_INJURY_AND_0_BEING_THE_INABILITY_TO_PERFORM_ANY_OF_YOUR_USUAL_DAILY_ACTIVITIES?: 75
SQUAT: ACTIVITY IS FAIRLY DIFFICULT
GO DOWN STAIRS: ACTIVITY IS SOMEWHAT DIFFICULT
SIT WITH YOUR KNEE BENT: ACTIVITY IS MINIMALLY DIFFICULT
PLEASE_INDICATE_YOR_PRIMARY_REASON_FOR_REFERRAL_TO_THERAPY:: KNEE
STIFFNESS: THE SYMPTOM AFFECTS MY ACTIVITY SLIGHTLY
AS_A_RESULT_OF_YOUR_KNEE_INJURY,_HOW_WOULD_YOU_RATE_YOUR_CURRENT_LEVEL_OF_DAILY_ACTIVITY?: ABNORMAL
STAND: ACTIVITY IS SOMEWHAT DIFFICULT
HOW_WOULD_YOU_RATE_THE_OVERALL_FUNCTION_OF_YOUR_KNEE_DURING_YOUR_USUAL_DAILY_ACTIVITIES?: ABNORMAL

## 2024-10-22 NOTE — PROGRESS NOTES
PHYSICAL THERAPY EVALUATION  Type of Visit: Evaluation              Subjective       Presenting condition or subjective complaint: knee pain and shifting  Date of onset: 10/10/24    Relevant medical history: Arthritis; Depression; Heart problems; Migraines or headaches; Severe headaches; Vision problems   Dates & types of surgery:      Prior diagnostic imaging/testing results:       Prior therapy history for the same diagnosis, illness or injury: No      Prior Level of Function  Transfers: Independent  Ambulation: Independent  ADL: Independent    Living Environment  Social support: With a significant other or spouse   Type of home: House   Stairs to enter the home: Yes 5 Is there a railing: Yes     Ramp: Yes   Stairs inside the home: Yes 25 Is there a railing: Yes     Help at home: None  Equipment owned:       Employment: No    Hobbies/Interests: bike, walk, farm, ski, cook, art, music    Patient goals for therapy: bike, walk, stand, bend without pain or shifting    Pain assessment: See objective evaluation for additional pain details     Chronic knee pain present for awhile, more flare ups recently lasting several days to weeks.  Family history of knee dislocations.  Bike commute often and walk a lot.  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 Every day feeling some discomfort, flare ups with increased activity especially biking and prolonged walking.  Planning on going to work out w/  in November.    Objective   KNEE EVALUATION  PAIN: Pain Level at Rest: 0/10  Pain Level with Use: 5/10  Pain Location: above and below bilateral patellas  Pain Quality: Sharp  Pain Frequency: flare ups  Pain is Exacerbated By: biking, walking, going up/down stairs  Pain is Relieved By: limit activity, ice, ibuprofen   GAIT:  Weightbearing Status:   Assistive Device(s): None  Gait Deviations: WFL  ROM:   (Degrees) Left AROM Left PROM  Right AROM Right PROM   Knee  Flexion WNL  WNL    Knee Extension WNL  WNL    Pain:   End feel:   STRENGTH:  Pain: - none + mild ++ moderate +++ severe  Strength Scale: 0-5/5 Left Right   Knee Flexion 4+/5 4- /5 +   Knee Extension 5 /5 + 5 /5   +   Hip flexion 4+/5 4-/5 +   Hip ER 5/5  + 5/5   Hip abduction 5/5 5/5     FLEXIBILITY: Decreased IT band L, Decreased hamstrings L, Decreased IT band R, Decreased hamstrings R  SPECIAL TESTS:    Left Right   Apley's (Meniscus)     Saundra's (Meniscus)     Lobo's (ITB/TFL) Positive Positive   Clarkes  Positive Positive   Patella Tracking     Ligamentous Stability     Anterior Drawer (ACL)     Posterior Drawer (PCL)     Prone Dial Test at 30 Deg and 90 Deg (PCL/PLC)     Valgus Stress Testing at 0 Deg and 30 Deg     Varus Stress Testing at 0 Deg and 30 Deg        FUNCTIONAL TESTS: Double Leg Squat: Anterior knee translation and Able to perform full deep squat with pain  Single Leg Squat: Anterior knee translation and no pain reported but feeling of instability/shifting of patella around 40 degrees  PALPATION:   + Tenderness At Location Left Right   Medial Joint Line     Lateral Joint Line     Patellar Tendon + +   IT Band     Incisional     Popliteal     Biceps Femoris     Semitendinosis     Semimembranosis     Glut Medius     Patellar Medial +    Patellar Lateral +    Patellar Superior +    Patellar Inferior  + +     JOINT MOBILITY:    Left Right   Tib-Fib Proximal     Patellofemoral Medial Hypermobile Hypermobile   Patellofemoral Lateral Hypermobile Hypermobile   Patellofemoral Superior WNL WNL   Patellofemoral Inferior WNL WNL            Assessment & Plan   CLINICAL IMPRESSIONS  Medical Diagnosis: Chronic pain of both knees    Treatment Diagnosis: bilateral knee pain   Impression/Assessment: Patient is a 33 year old female with bilateral knee complaints.  The following significant findings have been identified: Pain, Decreased strength, Impaired muscle performance, and Decreased activity tolerance.  These impairments interfere with their ability to perform recreational activities, household mobility, and community mobility as compared to previous level of function.     Clinical Decision Making (Complexity):  Clinical Presentation: Stable/Uncomplicated  Clinical Presentation Rationale: based on medical and personal factors listed in PT evaluation  Clinical Decision Making (Complexity): Low complexity    PLAN OF CARE  Treatment Interventions:  Interventions: Manual Therapy, Neuromuscular Re-education, Therapeutic Activity, Therapeutic Exercise, Self-Care/Home Management    Long Term Goals     PT Goal 1  Goal Identifier: LTG  Goal Description: be able to bike w/ minimal bilateral knee pain (3/10 or less)  Rationale: to maximize safety and independence with transportation  Target Date: 01/19/25  PT Goal 2  Goal Identifier: LTG  Goal Description: be able to climb up/down stairs w/ minimal bilateral knee pain (3/10 or less)  Rationale: to maximize safety and independence within the home;to maximize safety and independence within the community  Target Date: 01/19/25      Frequency of Treatment: x2/month  Duration of Treatment: 3 months    Recommended Referrals to Other Professionals:  None  Education Assessment:   Learner/Method: Patient;Listening;Reading;Demonstration;Pictures/Video;No Barriers to Learning    Risks and benefits of evaluation/treatment have been explained.   Patient/Family/caregiver agrees with Plan of Care.     Evaluation Time:     PT Eval, Low Complexity Minutes (64772): 20     Signing Clinician: Ghazal Mierles, PT        [facility]                                                                                   OUTPATIENT PHYSICAL THERAPY      PLAN OF TREATMENT FOR OUTPATIENT REHABILITATION   Patient's Last Name, First Name, Lynnette Lopez YOB: 1990   Provider's Name   [facility]   Medical Record No.  [id number]      Onset Date: 10/10/24  Start of Care Date: 10/22/24     Medical Diagnosis:  Chronic pain of both knees      PT Treatment Diagnosis:  bilateral knee pain Plan of Treatment  Frequency/Duration: x2/month/ 3 months    Certification date from 10/22/24 to 01/19/25         See note for plan of treatment details and functional goals     Ghazal Mireles, PT                         I CERTIFY THE NEED FOR THESE SERVICES FURNISHED UNDER        THIS PLAN OF TREATMENT AND WHILE UNDER MY CARE     (Physician attestation of this document indicates review and certification of the therapy plan).              Referring Provider:  Ciro Hutchinson    Initial Assessment  See Epic Evaluation- Start of Care Date: 10/22/24

## 2024-10-23 ENCOUNTER — PATIENT OUTREACH (OUTPATIENT)
Dept: CARE COORDINATION | Facility: CLINIC | Age: 34
End: 2024-10-23
Payer: COMMERCIAL

## 2024-10-24 DIAGNOSIS — F32.0 CURRENT MILD EPISODE OF MAJOR DEPRESSIVE DISORDER WITHOUT PRIOR EPISODE (H): ICD-10-CM

## 2024-10-24 NOTE — TELEPHONE ENCOUNTER
Insurance will not cover 3 of the 5 mg tab/ day. lexipro or 15 mg per day .     Can it be a 10 mg and  5 mg prescription?   In the past has had this and it was okayed by insurance,     Pt has some but is about to leave town on Tuesday and will need it by Monday if possible.   Please advise and send in new script.   Please send [employer] message when has been sent in.     Orders pended.   Thanks,   Aki Cao RN  [facility]

## 2024-10-25 ENCOUNTER — TELEPHONE (OUTPATIENT)
Dept: FAMILY MEDICINE | Facility: CLINIC | Age: 34
End: 2024-10-25
Payer: COMMERCIAL

## 2024-10-25 RX ORDER — ESCITALOPRAM OXALATE 10 MG/1
10 TABLET ORAL DAILY
Qty: 90 TABLET | Refills: 1 | Status: SHIPPED | OUTPATIENT
Start: 2024-10-25

## 2024-10-25 RX ORDER — ESCITALOPRAM OXALATE 5 MG/1
5 TABLET ORAL DAILY
Qty: 90 TABLET | Refills: 1 | Status: SHIPPED | OUTPATIENT
Start: 2024-10-25

## 2024-10-25 NOTE — TELEPHONE ENCOUNTER
Prior Authorization Retail Medication Request    Medication/Dose: Escitalopram 5 mg  Diagnosis and ICD code (if different than what is on RX):  F32.0  New/renewal/insurance change PA/secondary ins. PA:  Previously Tried and Failed:    Rationale:      Insurance Blue Plus  Primary:   Insurance ID: [id number]    Secondary (if applicable):  Insurance ID:      Pharmacy Information (if different than what is on RX)  Name:    [facility] - [address]     Phone:    Fax:    Clinic Information  Preferred routing pool for dept communication:

## 2024-11-06 ENCOUNTER — PATIENT OUTREACH (OUTPATIENT)
Dept: CARE COORDINATION | Facility: CLINIC | Age: 34
End: 2024-11-06
Payer: COMMERCIAL

## 2024-11-21 ENCOUNTER — TELEPHONE (OUTPATIENT)
Dept: PHYSICAL THERAPY | Facility: CLINIC | Age: 34
End: 2024-11-21

## 2024-12-05 ENCOUNTER — THERAPY VISIT (OUTPATIENT)
Dept: PHYSICAL THERAPY | Facility: CLINIC | Age: 34
End: 2024-12-05
Payer: COMMERCIAL

## 2024-12-05 DIAGNOSIS — M25.562 CHRONIC PAIN OF BOTH KNEES: Primary | ICD-10-CM

## 2024-12-05 DIAGNOSIS — G89.29 CHRONIC PAIN OF BOTH KNEES: Primary | ICD-10-CM

## 2024-12-05 DIAGNOSIS — M25.561 CHRONIC PAIN OF BOTH KNEES: Primary | ICD-10-CM

## 2024-12-23 DIAGNOSIS — L73.9 FOLLICULITIS: Primary | ICD-10-CM

## 2024-12-23 RX ORDER — KETOCONAZOLE 20 MG/ML
SHAMPOO, SUSPENSION TOPICAL
Qty: 120 ML | Refills: 11 | Status: SHIPPED | OUTPATIENT
Start: 2024-12-23

## 2024-12-23 RX ORDER — CLINDAMYCIN PHOSPHATE 11.9 MG/ML
SOLUTION TOPICAL 2 TIMES DAILY
Qty: 60 ML | Refills: 11 | Status: SHIPPED | OUTPATIENT
Start: 2024-12-23

## 2024-12-23 NOTE — PROGRESS NOTES
Kalyan Church,     So sorry I must have forgot to send them. I apologize, and thank you for the reminder. I sent them now to the [facility] pharmacy. Happy holidays!    Jeanna Laurent, [place]  Dermatology

## 2025-01-14 ENCOUNTER — ANCILLARY PROCEDURE (OUTPATIENT)
Dept: MAMMOGRAPHY | Facility: CLINIC | Age: 35
End: 2025-01-14
Attending: NURSE PRACTITIONER
Payer: COMMERCIAL

## 2025-01-14 ENCOUNTER — THERAPY VISIT (OUTPATIENT)
Dept: PHYSICAL THERAPY | Facility: CLINIC | Age: 35
End: 2025-01-14
Payer: COMMERCIAL

## 2025-01-14 DIAGNOSIS — M25.562 CHRONIC PAIN OF BOTH KNEES: Primary | ICD-10-CM

## 2025-01-14 DIAGNOSIS — G89.29 CHRONIC PAIN OF BOTH KNEES: Primary | ICD-10-CM

## 2025-01-14 DIAGNOSIS — N64.4 BREAST TENDERNESS IN FEMALE: ICD-10-CM

## 2025-01-14 DIAGNOSIS — M25.561 CHRONIC PAIN OF BOTH KNEES: Primary | ICD-10-CM

## 2025-01-14 PROCEDURE — 97110 THERAPEUTIC EXERCISES: CPT | Mod: GP | Performed by: PHYSICAL THERAPIST

## 2025-01-14 PROCEDURE — 97530 THERAPEUTIC ACTIVITIES: CPT | Mod: GP | Performed by: PHYSICAL THERAPIST

## 2025-01-14 PROCEDURE — G0279 TOMOSYNTHESIS, MAMMO: HCPCS | Performed by: RADIOLOGY

## 2025-01-14 PROCEDURE — 77066 DX MAMMO INCL CAD BI: CPT | Performed by: RADIOLOGY

## 2025-01-14 NOTE — PROGRESS NOTES
PLAN  Continue therapy per current plan of care.    Beginning/End Dates of Progress Note Reporting Period:  10/22/24 to 01/14/2025    Referring Provider:  Ciro Hutchinson     01/14/25 0500   Appointment Info   Signing clinician's name / credentials joan Erickson   Total/Authorized Visits E+T (6)   Visits Used 3   Medical Diagnosis Chronic pain of both knees   PT Tx Diagnosis bilateral knee pain with PFPS   Progress Note/Certification   Start of Care Date 10/22/24   Onset of illness/injury or Date of Surgery 10/10/24   Therapy Frequency x1/month   Predicted Duration 2 months   Certification date from 01/20/25   Certification date to 03/20/25   Progress Note Due Date 03/20/24   Progress Note Completed Date 10/22/24   GOALS   PT Goals 2   PT Goal 1   Goal Identifier LTG   Goal Description be able to bike w/ minimal bilateral knee pain (3/10 or less)   Rationale to maximize safety and independence with transportation   Goal Progress Hasn't tried this lately but is setting up a  so will start trying this   Target Date 03/20/25   PT Goal 2   Goal Identifier LTG   Goal Description be able to climb up/down stairs w/ minimal bilateral knee pain (3/10 or less)   Rationale to maximize safety and independence within the home;to maximize safety and independence within the community   Target Date 01/19/25   Date Met 01/14/25   Subjective Report   Subjective Report Has been doing her PT about 2-3x/wk or more.  She also started an intro to weight lifting class.  Knees still hurt with the weight lifting class, but has less pain with stairs now.  Can still feel it sometimes with walking.   Objective Measures   Objective Measures Objective Measure 1;Objective Measure 2;Objective Measure 3;Objective Measure 4;Objective Measure 5   Objective Measure 1   Objective Measure knee strength   Details Flex and ext 5-/5B and pain only with knee ext on the L   Objective Measure 2   Objective Measure hip strength   Details B hip  "strength all 5/5 now   Objective Measure 3   Objective Measure knee special tests   Details + edyta Obers and Clarkes   Objective Measure 4   Objective Measure single leg squat   Details feeling of instability/shifting of patella around 30 degrees   Objective Measure 5   Objective Measure Palpation   Details TTP edyta patellar tendon and inferior patella, L medial/lateral/superior patella   Treatment Interventions (PT)   Interventions Therapeutic Procedure/Exercise;Therapeutic Activity   Therapeutic Procedure/Exercise   Therapeutic Procedures: strength, endurance, ROM, flexibility minutes (26891) 30   Therapeutic Procedures Ther Proc 2   Ther Proc 1 seated knee ext with BTB   Ther Proc 1 - Details 3x15B with some good fatigue   Ther Proc 2 Hamstring   Ther Proc 2 - Details deadlift form for class 2x15   PTRx Ther Proc 1 Lateral Step Down   PTRx Ther Proc 1 - Details 3\" 3x15 and cues to do this many and focus on medial quad   PTRx Ther Proc 2 Bridging #2b   PTRx Ther Proc 2 - Details x30   PTRx Ther Proc 3 Bridge with hip ER   PTRx Ther Proc 3 - Details DC   Skilled Intervention verbal and visual cueing provided   Patient Response/Progress tolerated shorter step better for R leg w/ decreased feeling of instablity   Therapeutic Activity   Therapeutic Activities: dynamic activities to improve functional performance minutes (55317) 10   Ther Act 1 Asking questions about weight class.  Its mostly free weights.  Does lunges, squats, deadlifts.  She tries adjusting to make it painfree.  IF she still has pain, she continues only if minimal.  Sometimes will feel the L knee increased pain after the class but not always.  Started this 1.5 wks ago so has only done it 4x.   Ther Act 1 - Details When in pain try: friction massage to patellar origin x1' and standing quad stretch on bench   PTRx Ther Act 1 Foam Roller IT Band   PTRx Ther Act 1 - Details used theraroller today x10 B   Skilled Intervention education, verbal and visual " cueing   Patient Response/Progress questions answered, understanding expressed, minimal pain along IT band w/ foam roller   Education   Learner/Method Patient;Listening;Reading;Demonstration;Pictures/Video;No Barriers to Learning   Plan   Home program PTRx on phone   Updates to plan of care hypermobile edyta patellas, pain at 40 degrees SL squat, IT massage for tight IT band, lateral step downs for quad strengthening   Plan for next session hopefully finalize HEP and DC   Total Session Time   Timed Code Treatment Minutes 40   Total Treatment Time (sum of timed and untimed services) 40

## 2025-01-17 ENCOUNTER — TELEPHONE (OUTPATIENT)
Dept: DERMATOLOGY | Facility: CLINIC | Age: 35
End: 2025-01-17
Payer: COMMERCIAL

## 2025-01-17 NOTE — TELEPHONE ENCOUNTER
Left message for patient to return call to [facility] to replay providers message below.

## 2025-01-17 NOTE — TELEPHONE ENCOUNTER
Left message for patient to return call to [facility] to replay providers message below. Made patient aware message available to view on [employer] as well.      Kalyan Church,     Great news your mammogram did not show anything suspicious.     Jeanna Laurent CNP  Dermatology   Written by BENNETT Mcelroy CNP on 1/15/2025  4:27 PM JOVANNI Manzanares LPN

## 2025-01-22 NOTE — TELEPHONE ENCOUNTER
Patient Contact    Attempt #2    Was call answered? No    Left a voicemail asking patient to give us a call back at our main dermatology line at [phone] or to view results on [employer].    Ramila REY RN BSN  [facility]  [phone]

## 2025-01-22 NOTE — TELEPHONE ENCOUNTER
Spoke with patient and gave results below.     Patient expressed understanding.     Routing to provider please advise:   Patient asked about dense breast tissue, she states it is hard to identify cancer via mammogram or ultrasound, wondering if aside from self exams  are there other tests that are more accurate that she should consider in the future?  Patient states we can send her a [employer] message with reply.     Ramila REY RN BSN  [facility]  [phone]

## 2025-01-22 NOTE — TELEPHONE ENCOUNTER
I do not review many mammograms and would suggest discussing with your primary care provider on this matter. I think dense breast tissue is a very common finding on mammograms but again, this is not really my area of specialty.

## 2025-01-23 NOTE — TELEPHONE ENCOUNTER
Sent patient MyChart message.     Ramila REY RN BSN  [facility]  [phone]

## 2025-01-24 ENCOUNTER — MYC MEDICAL ADVICE (OUTPATIENT)
Dept: FAMILY MEDICINE | Facility: CLINIC | Age: 35
End: 2025-01-24
Payer: COMMERCIAL

## 2025-01-24 DIAGNOSIS — G43.009 MIGRAINE WITHOUT AURA AND WITHOUT STATUS MIGRAINOSUS, NOT INTRACTABLE: Primary | ICD-10-CM

## 2025-01-24 DIAGNOSIS — M54.2 NECK PAIN: ICD-10-CM

## 2025-01-24 DIAGNOSIS — M54.9 UPPER BACK PAIN: ICD-10-CM

## 2025-02-11 ENCOUNTER — THERAPY VISIT (OUTPATIENT)
Dept: PHYSICAL THERAPY | Facility: CLINIC | Age: 35
End: 2025-02-11
Payer: COMMERCIAL

## 2025-02-11 DIAGNOSIS — M25.562 CHRONIC PAIN OF BOTH KNEES: Primary | ICD-10-CM

## 2025-02-11 DIAGNOSIS — M25.561 CHRONIC PAIN OF BOTH KNEES: Primary | ICD-10-CM

## 2025-02-11 DIAGNOSIS — G89.29 CHRONIC PAIN OF BOTH KNEES: Primary | ICD-10-CM

## 2025-02-11 PROCEDURE — 97110 THERAPEUTIC EXERCISES: CPT | Mod: GP | Performed by: PHYSICAL THERAPIST

## 2025-02-11 ASSESSMENT — ACTIVITIES OF DAILY LIVING (ADL)
LIMPING: I DO NOT HAVE THE SYMPTOM
HOW_WOULD_YOU_RATE_THE_CURRENT_FUNCTION_OF_YOUR_KNEE_DURING_YOUR_USUAL_DAILY_ACTIVITIES_ON_A_SCALE_FROM_0_TO_100_WITH_100_BEING_YOUR_LEVEL_OF_KNEE_FUNCTION_PRIOR_TO_YOUR_INJURY_AND_0_BEING_THE_INABILITY_TO_PERFORM_ANY_OF_YOUR_USUAL_DAILY_ACTIVITIES?: 80
GIVING WAY, BUCKLING OR SHIFTING OF KNEE: THE SYMPTOM AFFECTS MY ACTIVITY MODERATELY
SIT WITH YOUR KNEE BENT: ACTIVITY IS NOT DIFFICULT
SQUAT: ACTIVITY IS SOMEWHAT DIFFICULT
RAW_SCORE: 56
STAND: ACTIVITY IS NOT DIFFICULT
GO DOWN STAIRS: ACTIVITY IS MINIMALLY DIFFICULT
RISE FROM A CHAIR: ACTIVITY IS NOT DIFFICULT
WALK: ACTIVITY IS MINIMALLY DIFFICULT
HOW_WOULD_YOU_RATE_THE_OVERALL_FUNCTION_OF_YOUR_KNEE_DURING_YOUR_USUAL_DAILY_ACTIVITIES?: NEARLY NORMAL
KNEE_ACTIVITY_OF_DAILY_LIVING_SCORE: 80
GO UP STAIRS: ACTIVITY IS MINIMALLY DIFFICULT
WEAKNESS: I HAVE THE SYMPTOM BUT IT DOES NOT AFFECT MY ACTIVITY
KNEE_ACTIVITY_OF_DAILY_LIVING_SUM: 56
KNEEL ON THE FRONT OF YOUR KNEE: ACTIVITY IS SOMEWHAT DIFFICULT
STIFFNESS: I DO NOT HAVE THE SYMPTOM
SWELLING: I DO NOT HAVE THE SYMPTOM
AS_A_RESULT_OF_YOUR_KNEE_INJURY,_HOW_WOULD_YOU_RATE_YOUR_CURRENT_LEVEL_OF_DAILY_ACTIVITY?: NEARLY NORMAL
PAIN: THE SYMPTOM AFFECTS MY ACTIVITY MODERATELY

## 2025-02-11 NOTE — PROGRESS NOTES
[facility]                                                                                   OUTPATIENT PHYSICAL THERAPY    PLAN OF TREATMENT FOR OUTPATIENT REHABILITATION   Patient's Last Name, First Name, Lynnette Lopez YOB: 1990   Provider's Name   [facility]   Medical Record No.  [id number]     Onset Date: 10/10/24  Start of Care Date: 10/22/24     Medical Diagnosis:  Chronic pain of both knees      PT Treatment Diagnosis:  bilateral knee pain with PFPS Plan of Treatment  Frequency/Duration: x2/month/ 3 months    Certification date from 01/20/25 to 04/19/25         See note for plan of treatment details and functional goals     Ghazal Mireles, PT                         I CERTIFY THE NEED FOR THESE SERVICES FURNISHED UNDER        THIS PLAN OF TREATMENT AND WHILE UNDER MY CARE     (Physician attestation of this document indicates review and certification of the therapy plan).              Referring Provider:  Ciro Hutchinson    Initial Assessment  See Epic Evaluation- Start of Care Date: 10/22/24            PLAN  Continue therapy per current plan of care.    Beginning/End Dates of Progress Note Reporting Period:  10/22/24 to 02/11/2025    Referring Provider:  Ciro Hutchinson     02/11/25 0500   Appointment Info   Signing clinician's name / credentials joan Erickson   Total/Authorized Visits E+T (6)   Visits Used 4   Medical Diagnosis Chronic pain of both knees   PT Tx Diagnosis bilateral knee pain with PFPS   Progress Note/Certification   Start of Care Date 10/22/24   Onset of illness/injury or Date of Surgery 10/10/24   Therapy Frequency x2/month   Predicted Duration 3 months   Certification date from 01/20/25   Certification date to 04/19/25   Progress Note Due Date 12/20/24   Progress Note Completed Date 10/22/24   GOALS   PT Goals 2;3   PT Goal 1   Goal Identifier LTG   Goal Description be able to bike w/  minimal bilateral knee pain (3/10 or less)   Rationale to maximize safety and independence with transportation   Goal Progress set up her  and felt a little pain aobve the knee during, but not afters so this was a great improvement   Target Date 02/12/25   Date Met 02/11/25   PT Goal 2   Goal Identifier LTG   Goal Description be able to climb up/down stairs w/ minimal bilateral knee pain (3/10 or less)   Rationale to maximize safety and independence within the home;to maximize safety and independence within the community   Target Date 01/19/25   Date Met 01/14/25   PT Goal 3   Goal Identifier LTG   Goal Description Be able to negotiate stairs painfree   Rationale to maximize safety and independence with performance of ADLs and functional tasks   Target Date 04/19/25   Subjective Report   Subjective Report Doing HEP 4x/day and weight lifting class 2x/wk but this stops now.  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  Knees are getting better   Objective Measures   Objective Measures Objective Measure 1;Objective Measure 2;Objective Measure 3;Objective Measure 4;Objective Measure 5   Objective Measure 1   Objective Measure knee strength   Details Flex and ext 5-/5B and pain only with knee ext on the L   Objective Measure 2   Objective Measure hip strength   Details B hip strength all 5/5 now   Objective Measure 3   Objective Measure knee special tests   Details + edyta Obers and Clarkes   Objective Measure 4   Objective Measure single leg squat   Details feeling of instability/shifting of patella around 45 degrees now   Objective Measure 5   Objective Measure Palpation   Details TTP edyta patellar tendon and inferior patella, L medial/lateral/superior patella   Treatment Interventions (PT)   Interventions Therapeutic Procedure/Exercise;Therapeutic Activity   Therapeutic Procedure/Exercise   Therapeutic Procedures: strength, endurance, ROM, flexibility minutes (23091) 40  "  Therapeutic Procedures Ther Proc 2;Ther Proc 3;Ther Proc 4;Ther Proc 5   Ther Proc 1 seated knee ext with BTB   Ther Proc 1 - Details can use silver now some of the time   Ther Proc 2 Hamstring   Ther Proc 2 - Details reviewed   PTRx Ther Proc 1 Lateral Step Down   PTRx Ther Proc 1 - Details 4\" 3x15   PTRx Ther Proc 2 Bridging #2b   PTRx Ther Proc 2 - Details hep   PTRx Ther Proc 3 Bridge with hip ER   PTRx Ther Proc 3 - Details DC   Skilled Intervention verbal and visual cueing provided   Patient Response/Progress tolerated shorter step better for R leg w/ decreased feeling of instablity   Ther Proc 3 self SOR with tennis balls or lacrosse balls   Ther Proc 3 - Details x3'   Ther Proc 4 scalene stretches and ways to use theracane without gripping it so hard   Ther Proc 4 - Details x5'   Ther Proc 5 standing quad stretch using table   Ther Proc 5 - Details 30\"x2   Therapeutic Activity   Ther Act 1 Asking questions about weight class.  Its mostly free weights.  Does lunges, squats, deadlifts.  She tries adjusting to make it painfree.  IF she still has pain, she continues only if minimal.  Sometimes will feel the L knee increased pain after the class but not always.  Started this 1.5 wks ago so has only done it 4x.   Ther Act 1 - Details When in pain try: friction massage to patellar origin x1' and standing quad stretch on bench   PTRx Ther Act 1 Foam Roller IT Band   PTRx Ther Act 1 - Details used theraroller today x10 B   Skilled Intervention education, verbal and visual cueing   Patient Response/Progress questions answered, understanding expressed, minimal pain along IT band w/ foam roller   Education   Learner/Method Patient;Listening;Reading;Demonstration;Pictures/Video;No Barriers to Learning   Plan   Home program PTRx on phone   Updates to plan of care hypermobile edyta patellas, pain at 40 degrees SL squat, IT massage for tight IT band, lateral step downs for quad strengthening   Plan for next session " hopefully finalize HEP and DC   Total Session Time   Timed Code Treatment Minutes 40   Total Treatment Time (sum of timed and untimed services) 40

## 2025-02-27 ENCOUNTER — VIRTUAL VISIT (OUTPATIENT)
Dept: FAMILY MEDICINE | Facility: CLINIC | Age: 35
End: 2025-02-27
Payer: COMMERCIAL

## 2025-02-27 ENCOUNTER — THERAPY VISIT (OUTPATIENT)
Dept: PHYSICAL THERAPY | Facility: CLINIC | Age: 35
End: 2025-02-27
Payer: COMMERCIAL

## 2025-02-27 DIAGNOSIS — G43.709 CHRONIC MIGRAINE WITHOUT AURA WITHOUT STATUS MIGRAINOSUS, NOT INTRACTABLE: Primary | ICD-10-CM

## 2025-02-27 DIAGNOSIS — F51.5: ICD-10-CM

## 2025-02-27 DIAGNOSIS — G43.009 MIGRAINE WITHOUT AURA AND WITHOUT STATUS MIGRAINOSUS, NOT INTRACTABLE: ICD-10-CM

## 2025-02-27 DIAGNOSIS — M54.9 UPPER BACK PAIN: Primary | ICD-10-CM

## 2025-02-27 DIAGNOSIS — M54.2 NECK PAIN: ICD-10-CM

## 2025-02-27 DIAGNOSIS — H57.12 OCULAR PAIN, LEFT EYE: ICD-10-CM

## 2025-02-27 DIAGNOSIS — F32.0 CURRENT MILD EPISODE OF MAJOR DEPRESSIVE DISORDER WITHOUT PRIOR EPISODE: ICD-10-CM

## 2025-02-27 RX ORDER — TOPIRAMATE 25 MG/1
25 TABLET, FILM COATED ORAL 2 TIMES DAILY
Qty: 30 TABLET | Refills: 1 | Status: SHIPPED | OUTPATIENT
Start: 2025-02-27

## 2025-02-27 ASSESSMENT — PATIENT HEALTH QUESTIONNAIRE - PHQ9
SUM OF ALL RESPONSES TO PHQ QUESTIONS 1-9: 12
SUM OF ALL RESPONSES TO PHQ QUESTIONS 1-9: 12
10. IF YOU CHECKED OFF ANY PROBLEMS, HOW DIFFICULT HAVE THESE PROBLEMS MADE IT FOR YOU TO DO YOUR WORK, TAKE CARE OF THINGS AT HOME, OR GET ALONG WITH OTHER PEOPLE: SOMEWHAT DIFFICULT

## 2025-02-27 ASSESSMENT — ENCOUNTER SYMPTOMS: HEADACHES: 1

## 2025-02-27 NOTE — PATIENT INSTRUCTIONS
[facility] [place]   [address]   [facility] [address]   Phone: [phone]          For neuropthalmology- If you don't hear from a representative within 2 business days, please call [phone].

## 2025-02-27 NOTE — PROGRESS NOTES
PHYSICAL THERAPY EVALUATION  Type of Visit: Evaluation              Subjective         Presenting condition or subjective complaint: migraines, tension headaches, eye pain  Date of onset: 01/24/25    Relevant medical history: Depression; Heart problems; Migraines or headaches; Severe headaches; Vision problems   Dates & types of surgery: wisdom teeth removal, 2006ish    Prior diagnostic imaging/testing results: MRI; CT scan; Other optometry tests   Prior therapy history for the same diagnosis, illness or injury: No      Prior Level of Function  Transfers: Independent  Ambulation: Independent  ADL: Independent  IADL: Driving, Finances, Housekeeping, Laundry, Meal preparation, Work    Living Environment  Social support: With a significant other or spouse   Type of home: House; 2-story; Basement   Stairs to enter the home: Yes 4 Is there a railing: Yes     Ramp: Yes   Stairs inside the home: Yes 24 Is there a railing: Yes     Help at home: Home management tasks (cooking, cleaning); Home and Yard maintenance tasks; Assist for driving and community activities  Equipment owned:       Employment: Yes pca / eldercare  Hobbies/Interests: art music books nature biking climbing hiking walking writing    Patient goals for therapy: work 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    Pain assessment: See objective evaluation for additional pain details     Objective   CERVICAL SPINE EVALUATION  PAIN:   Pain Level at Rest: 2-6/10  Pain Level with Use: 2-6/10  Pain Location: B upper traps, scalenes and then L sided eye pain and migarines  Pain Quality: Aching, Burning, Miserable, Nagging, Penetrating, and Throbbing, sharp, shootting  Pain Frequency: constant  Pain is Worst: daytime  Pain is Exacerbated By: sitting with head rotated, poor posture, prolonged sitting(sometimes just 5 min and other times 1 hour), heat, lights, sound  Pain is Relieved By: stay hydrated, lie down in the dark, massage, try to have good posture, NSAIDs  Pain  Progression: Worsened    POSTURE:  fair, but with prolonged sitting, head does protrude and shoulders round    ROM:   (Degrees) Left AROM Right AROM    Cervical Flexion 40+    Cervical Extension 48    Cervical Side bend 32 35    Cervical Rotation 68+ 68+    Cervical Protrusion Wnl+    Cervical Retraction wnl    Thoracic Flexion     Thoracic Extension     Thoracic Rotation       Left AROM Left PROM Right AROM Right PROM   Shoulder Flexion wnl  wnl    Shoulder Extension       Shoulder Abduction       Shoulder Adduction       Shoulder IR       Shoulder ER       Shoulder Horiz Abduction       Shoulder Horiz Adduction       Pain:   End Feel:     MYOTOMES: WNL      FLEXIBILITY: Decreased scalenes L, Decreased upper trap L, Decreased levator L, Decreased pectoralis major L, Decreased pectoralis minor L, Decreased scalenes R, Decreased upper trap R, Decreased levator R, Decreased pectoralis major R, Decreased pectoralis minor R     PALPATION:   + Tenderness At Location Left Right   Sternocleidomastoid     Scalenes + +   Rhomboids     Facet     Upper Trap + +   Levator + +   Erector Spinae + +   Suboccipitals + +     SPINAL SEGMENTAL CONCLUSIONS: WNL      Assessment & Plan   CLINICAL IMPRESSIONS  Medical Diagnosis: Neck and upper back pain    Treatment Diagnosis: Neck and upper back pain   Impression/Assessment: Patient is a 34 year old female with neck and upper back pain and migraine complaints.  The following significant findings have been identified: Pain, Decreased ROM/flexibility, Impaired muscle performance, Decreased activity tolerance, and Impaired posture. These impairments interfere with their ability to perform work tasks, recreational activities, household chores, and driving  as compared to previous level of function.     Clinical Decision Making (Complexity):  Clinical Presentation: Evolving/Changing  Clinical Presentation Rationale: based on medical and personal factors listed in PT evaluation  Clinical  Decision Making (Complexity): Low complexity    PLAN OF CARE  Treatment Interventions:  Interventions: Manual Therapy, Neuromuscular Re-education, Therapeutic Activity, Therapeutic Exercise    Long Term Goals     PT Goal 1  Goal Identifier: LTG  Goal Description: Patient is able to complete a full work day with pain<5/10 at least 3-4 days/wk  Rationale:  (to allow patient to start and keep her new job)  Target Date: 05/27/25      Frequency of Treatment: 1x/wk  Duration of Treatment: 3 months    Recommended Referrals to Other Professionals:  none  Education Assessment:   Learner/Method: Patient;Listening;Reading;Demonstration;Pictures/Video    Risks and benefits of evaluation/treatment have been explained.   Patient/Family/caregiver agrees with Plan of Care.     Evaluation Time:     PT Eval, Low Complexity Minutes (92563): 14       Signing Clinician: PACO Sullivan [facility]                                                                                   OUTPATIENT PHYSICAL THERAPY      PLAN OF TREATMENT FOR OUTPATIENT REHABILITATION   Patient's Last Name, First Name, STEVENLynnette Mike YOB: 1990   Provider's Name   [facility]   Medical Record No.  [id number]     Onset Date: 01/24/25  Start of Care Date: 02/27/25     Medical Diagnosis:  Neck and upper back pain      PT Treatment Diagnosis:  Neck and upper back pain Plan of Treatment  Frequency/Duration: 1x/wk/ 3 months    Certification date from 02/27/25 to 05/27/25         See note for plan of treatment details and functional goals     Ghazal Mireles, PT                         I CERTIFY THE NEED FOR THESE SERVICES FURNISHED UNDER        THIS PLAN OF TREATMENT AND WHILE UNDER MY CARE     (Physician attestation of this document indicates review and certification of the therapy plan).              Referring Provider:  Ciro Hutchinson    Initial Assessment  See Epic  Evaluation- Start of Care Date: 02/27/25

## 2025-02-27 NOTE — PROGRESS NOTES
Lynnette is a 34 year old who is being evaluated via a billable video visit.    How would you like to obtain your AVS? MyChart  If the video visit is dropped, the invitation should be resent by: Text to cell phone: [phone]  Will anyone else be joining your video visit? No      Assessment & Plan     1. Chronic migraine without aura without status migrainosus, not intractable (Primary)  Plan: I do recommend to proceed with neurophthalmology consultation and referral is given.She has a choice to be seen at  [facility] of neurology for opinion and management.       - Adult Neurology  Referral; Future  - Adult Eye  Referral; Future    She reports rest, ibuprofen seem to keep her functional and she does not want to try another triptan. She has seen PHYSICAL THERAPY for muscle tension as a way to prevent migraine headache    Preventive medications choices discuss - and I think we can try topiramate   - topiramate (TOPAMAX) 25 MG tablet; Take 1 tablet (25 mg) by mouth 2 times daily.  Dispense: 30 tablet; Refill: 1  Potential medication side effects were discussed with the patient; let me know if any occur.    She is concerned about side effects and its understandable. Beta blocker was also discussed and deferred for now      2. Ocular pain, left eye  - Adult Eye  Referral; Future- neurophthalmology    3. Current mild episode of major depressive disorder without prior episode  She is to continue with selective serotonin reuptake inhibitor     4. Nightmare disorder of early childhood  Encouraged talk therapy           Prescription drug management  I spent a total of 55 minutes on the day of the visit.   Time spent by me today doing chart review, history and exam, documentation and further activities per the note          Subjective   Lynnette is a 34 year old, presenting for the following health issues:  Headache      2/27/2025     3:00 PM   Additional Questions   Roomed by elaine thomas  "  Accompanied by n/a         2/27/2025     3:00 PM   Patient Reported Additional Medications   Patient reports taking the following new medications n/a     Video Start Time: 3:18 PM      Headache     History of Present Illness       Headaches:   Since the patient's last clinic visit, headaches are: worsened  The patient is getting headaches:  1-4 days a week of migraines, daily low grade pain  She is not able to do normal daily activities when she has a migraine.  The patient is taking the following rescue/relief medications:  Ibuprofen (Advil, Motrin)   Patient states \"The relief is inconsistent\" from the rescue/relief medications.   The patient is taking the following medications to prevent migraines:  No medications to prevent migraines  In the past 4 weeks, the patient has gone to an Urgent Care or Emergency Room 0 times times due to headaches.    Reason for visit:  Discuss options for diagnosis and management of chronic pain    She eats 2-3 servings of fruits and vegetables daily.She consumes 1 sweetened beverage(s) daily.She exercises with enough effort to increase her heart rate 10 to 19 minutes per day.  She exercises with enough effort to increase her heart rate 4 days per week. She is missing 1 dose(s) of medications per week.  She is not taking prescribed medications regularly due to remembering to take.    Known history of migraine W/O aura since curt[place] high , typically left sided, pounding headache, with photophobia & phonophobia. Also tension type headache as well  Also eye pain.  Hydration, with water, rest in dark room, ibuprofen helps somewhat- usually dulls migraine pain enough to function at least.  In past imitrex- caused palpitation and fluttery chest and did not catch her migraine .    Migraine 1-4 d/wk- each episode may last up to 1-2 days .  Has been talking to PHYSICAL THERAPY   And everyone keeps asking if this is due to TBI.      Neurology appointment 3/2024-Deandre Mendes, "   MRI brain 2023 did not show evidence of optic neuritis, demyelinating lesions, tumor, or infarction upon review today and at the time of imaging.     Also suffering with undiagnosed Left eye pain(was dailly) & its different than migraine headache - relatively new since 2022  Glare sensitivity and binocular disorder- still unclear why left eye pain      She does have specific questions if she actually has - Aquired brain injury & should be seen by specific neurologist  She reports birth history had trauma- including her  Twin  & she had a   Cord round the neck 3 times- came out blue.    She clearly wants  neurophthalmology  referral & waited for her to be contacted and is unsure - what came out of that past referral       She is worried about New job- starting at the end of March- and computer based - that elevates migraine headache .    Is worried about starting any new medications- because of sensitivity and side effects possibility    Has had slight increase in selective serotonin reuptake inhibitor - lexapro from  10- 15 mg once daily - recently  she denies suicidal thoughts or ideation.reports no side effects from medications. Would like to continue.         Review of Systems  Constitutional, HEENT, cardiovascular, pulmonary, GI, , musculoskeletal, neuro, skin, endocrine and psych systems are negative, except as otherwise noted.      Objective    Vitals - Patient Reported  Systolic (Patient Reported):  (n/a)  Diastolic (Patient Reported):  (n/a)  Weight (Patient Reported):  (n/a)  Height (Patient Reported):  (n/a)  SpO2 (Patient Reported):  (n/a)  Temperature (Patient Reported):  (n/a)  Pulse (Patient Reported):  (n/a)      Vitals:  No vitals were obtained today due to virtual visit.    Physical Exam   GENERAL: alert and no distress  EYES: Eyes grossly normal to inspection.  No discharge or erythema, or obvious scleral/conjunctival abnormalities.  RESP: No audible wheeze, cough, or visible  cyanosis.    SKIN: Visible skin clear. No significant rash, abnormal pigmentation or lesions.  NEURO: Cranial nerves grossly intact.  Mentation and speech appropriate for age.  PSYCH: Appropriate affect, tone, and pace of words          Video-Visit Details    Type of service:  Video Visit   Video End Time:4:02 PM  Originating Location (pt. Location): Home    Distant Location (provider location):  On-site  Platform used for Video Visit: Nisha  Signed Electronically by: Gloria Johansen MD

## 2025-03-11 ENCOUNTER — OFFICE VISIT (OUTPATIENT)
Dept: URGENT CARE | Facility: URGENT CARE | Age: 35
End: 2025-03-11
Payer: COMMERCIAL

## 2025-03-11 ENCOUNTER — NURSE TRIAGE (OUTPATIENT)
Dept: FAMILY MEDICINE | Facility: CLINIC | Age: 35
End: 2025-03-11
Payer: COMMERCIAL

## 2025-03-11 ENCOUNTER — ANCILLARY PROCEDURE (OUTPATIENT)
Dept: GENERAL RADIOLOGY | Facility: CLINIC | Age: 35
End: 2025-03-11
Attending: NURSE PRACTITIONER
Payer: COMMERCIAL

## 2025-03-11 VITALS
DIASTOLIC BLOOD PRESSURE: 83 MMHG | RESPIRATION RATE: 17 BRPM | WEIGHT: 150 LBS | HEART RATE: 61 BPM | HEIGHT: 62 IN | OXYGEN SATURATION: 94 % | TEMPERATURE: 97.9 F | BODY MASS INDEX: 27.6 KG/M2 | SYSTOLIC BLOOD PRESSURE: 126 MMHG

## 2025-03-11 DIAGNOSIS — R53.81 MALAISE: ICD-10-CM

## 2025-03-11 DIAGNOSIS — J20.9 ACUTE BRONCHITIS WITH SYMPTOMS GREATER THAN 10 DAYS: Primary | ICD-10-CM

## 2025-03-11 DIAGNOSIS — R05.1 ACUTE COUGH: ICD-10-CM

## 2025-03-11 DIAGNOSIS — R06.02 SOB (SHORTNESS OF BREATH): ICD-10-CM

## 2025-03-11 PROCEDURE — 99213 OFFICE O/P EST LOW 20 MIN: CPT | Performed by: NURSE PRACTITIONER

## 2025-03-11 PROCEDURE — 71046 X-RAY EXAM CHEST 2 VIEWS: CPT | Mod: TC | Performed by: STUDENT IN AN ORGANIZED HEALTH CARE EDUCATION/TRAINING PROGRAM

## 2025-03-11 RX ORDER — AZITHROMYCIN 250 MG/1
TABLET, FILM COATED ORAL
Qty: 6 TABLET | Refills: 0 | Status: SHIPPED | OUTPATIENT
Start: 2025-03-11 | End: 2025-03-16

## 2025-03-11 NOTE — TELEPHONE ENCOUNTER
"Patient called to request advice on symptoms  Patient states has had an upper respiratory infection for the last 10days or so  Patient notes persistent cough, sore throat, fatigue, slight nasal congestion, states is uncomfortable to breathe \"feels like I am inhaling fumes\"  States feels short of breath at rest or with talking  Has not been seen for any of these symptoms  Nurse advised with respiratory concerns would be best to be seen in ER  Patient asked about UC  Nurse educated on reasoning for disposition such as resources available at the different locations  Patient verbalized understanding and agrees with plan of care.   Will call back with new or worsening symptoms    Hailey MORALES RN      Reason for Disposition   MODERATE difficulty breathing (e.g., speaks in phrases, SOB even at rest, pulse 100-120) and still present when not coughing    Additional Information   Negative: SEVERE difficulty breathing (e.g., struggling for each breath, speaks in single words)   Negative: Bluish (or gray) lips or face   Negative: Shock suspected (e.g., cold/pale/clammy skin, too weak to stand, low BP, rapid pulse)   Negative: Sounds like a life-threatening emergency to the triager   Negative: Bluish (or gray) lips or face   Negative: SEVERE difficulty breathing (e.g., struggling for each breath, speaks in single words)   Negative: Rapid onset of cough and has hives   Negative: Coughing started suddenly after medicine, an allergic food or bee sting   Negative: Difficulty breathing after exposure to flames, smoke, or fumes   Negative: Sounds like a life-threatening emergency to the triager   Negative: Chest pain present when not coughing   Negative: Passed out (e.g., fainted, lost consciousness, blacked out and was not responding)    Protocols used: Influenza (Flu) - Seasonal-A-OH, Cough-A-OH    "

## 2025-03-11 NOTE — PROGRESS NOTES
Assessment & Plan     1. Acute bronchitis with symptoms greater than 10 days (Primary)    Discussed symptoms of viral infection likely bronchial due to of illness and present symptoms recommend oral antibiotic as directed patient will wait 2 to 3 days to see if symptoms improve before starting this..  We discussed that symptoms of cough will likely be the last 2 clear.  May use OTC treatment such as Mucinex, Robitussin or other antitussive medication.  Vaporizer or humidifier in room would be helpful.  We discussed using throat lozenges to help with throat and cough.  We discussed red flag symptoms that would warrant emergent or urgent evaluation patient verbalized understanding and was in agreement with this plan.    - azithromycin (ZITHROMAX) 250 MG tablet; Take 2 tablets (500 mg) by mouth daily for 1 day, THEN 1 tablet (250 mg) daily for 4 days.  Dispense: 6 tablet; Refill: 0    2. Acute cough    - XR Chest 2 Views    3. SOB (shortness of breath)    - XR Chest 2 Views    4. Malaise    - XR Chest 2 Views    X-ray clear for infectious process per radiology read see below.    BENNETT Contreras [facility] C[facility] [place]    Patrick Church is a 34 year old female who presents to clinic today for the following health issues:  Chief Complaint   Patient presents with    Urgent Care    Cough     X10 days of cough    Shortness of Breath     X2 days of Shortness of breath, discomfort in chest     Nasal Congestion     X10 days of congestion, dark mucus     Generalized Body Aches       HPI      URI Adult    Onset of symptoms was 2 week(s) ago.  Course of illness is worsening.    Severity moderate  Current and Associated symptoms: chills, sweats, runny nose, cough - non-productive, shortness of breath, and body aches  Treatment measures tried include Tylenol/Ibuprofen, Fluids, and Rest.  Predisposing factors include None.      Review of Systems  Constitutional, HEENT, cardiovascular, pulmonary,  "gi and gu systems are negative, except as otherwise noted.      Objective    /83   Pulse 61   Temp 97.9  F (36.6  C) (Temporal)   Resp 17   Ht 1.575 m (5' 2\")   Wt 68 kg (150 lb)   SpO2 94%   BMI 27.44 kg/m    Physical Exam   GENERAL: alert and no distress  EYES: Eyes grossly normal to inspection, PERRL and conjunctivae and sclerae normal  HENT: normal cephalic/atraumatic, ear canals and TM's normal, nose and mouth without ulcers or lesions, oropharynx clear, and oral mucous membranes moist  NECK: bilateral anterior cervical adenopathy, no asymmetry, masses, or scars, and thyroid normal to palpation  RESP: lungs clear to auscultation - no rales, rhonchi or wheezes  CV: regular rate and rhythm, normal S1 S2, no S3 or S4, no murmur, click or rub, no peripheral edema  ABDOMEN: soft, nontender, no hepatosplenomegaly, no masses and bowel sounds normal  MS: no gross musculoskeletal defects noted, no edema    Results for orders placed or performed in visit on 03/11/25   XR Chest 2 Views     Status: None    Narrative    EXAM: XR CHEST 2 VIEWS  LOCATION: [facility]  DATE: 3/11/2025    INDICATION:  Acute cough, SOB (shortness of breath), Malaise  COMPARISON: Chest radiograph 4/9/2023      Impression    IMPRESSION: No focal consolidation, pleural effusion or pneumothorax. Cardiomediastinal silhouette is unremarkable. Similar right basilar calcified granuloma.           "

## 2025-03-29 DIAGNOSIS — G43.709 CHRONIC MIGRAINE WITHOUT AURA WITHOUT STATUS MIGRAINOSUS, NOT INTRACTABLE: ICD-10-CM

## 2025-03-31 RX ORDER — TOPIRAMATE 25 MG/1
25 TABLET, FILM COATED ORAL 2 TIMES DAILY
Qty: 60 TABLET | Refills: 0 | Status: SHIPPED | OUTPATIENT
Start: 2025-03-31

## 2025-04-10 ENCOUNTER — THERAPY VISIT (OUTPATIENT)
Age: 35
End: 2025-04-10
Payer: COMMERCIAL

## 2025-04-10 DIAGNOSIS — M25.562 CHRONIC PAIN OF BOTH KNEES: ICD-10-CM

## 2025-04-10 DIAGNOSIS — G89.29 CHRONIC PAIN OF BOTH KNEES: ICD-10-CM

## 2025-04-10 DIAGNOSIS — M54.2 NECK PAIN: ICD-10-CM

## 2025-04-10 DIAGNOSIS — M54.9 UPPER BACK PAIN: ICD-10-CM

## 2025-04-10 DIAGNOSIS — G43.009 MIGRAINE WITHOUT AURA AND WITHOUT STATUS MIGRAINOSUS, NOT INTRACTABLE: Primary | ICD-10-CM

## 2025-04-10 DIAGNOSIS — M25.561 CHRONIC PAIN OF BOTH KNEES: ICD-10-CM

## 2025-04-11 PROBLEM — F33.1 MODERATE RECURRENT MAJOR DEPRESSION (H): Status: ACTIVE | Noted: 2025-04-11

## 2025-04-18 ENCOUNTER — TRANSFERRED RECORDS (OUTPATIENT)
Dept: HEALTH INFORMATION MANAGEMENT | Facility: CLINIC | Age: 35
End: 2025-04-18
Payer: COMMERCIAL

## 2025-04-21 ENCOUNTER — TRANSCRIBE ORDERS (OUTPATIENT)
Dept: OTHER | Age: 35
End: 2025-04-21

## 2025-04-21 DIAGNOSIS — H53.30 DISORDER OF BINOCULAR VISION: Primary | ICD-10-CM

## 2025-04-24 ENCOUNTER — THERAPY VISIT (OUTPATIENT)
Age: 35
End: 2025-04-24
Payer: COMMERCIAL

## 2025-04-24 DIAGNOSIS — M54.9 UPPER BACK PAIN: ICD-10-CM

## 2025-04-24 DIAGNOSIS — G43.009 MIGRAINE WITHOUT AURA AND WITHOUT STATUS MIGRAINOSUS, NOT INTRACTABLE: Primary | ICD-10-CM

## 2025-04-24 DIAGNOSIS — M54.2 NECK PAIN: ICD-10-CM

## 2025-05-22 ENCOUNTER — THERAPY VISIT (OUTPATIENT)
Age: 35
End: 2025-05-22
Payer: COMMERCIAL

## 2025-05-22 DIAGNOSIS — M54.2 NECK PAIN: ICD-10-CM

## 2025-05-22 DIAGNOSIS — M54.9 UPPER BACK PAIN: ICD-10-CM

## 2025-05-22 DIAGNOSIS — G43.009 MIGRAINE WITHOUT AURA AND WITHOUT STATUS MIGRAINOSUS, NOT INTRACTABLE: Primary | ICD-10-CM

## 2025-05-27 ENCOUNTER — THERAPY VISIT (OUTPATIENT)
Age: 35
End: 2025-05-27
Payer: COMMERCIAL

## 2025-05-27 DIAGNOSIS — M54.9 UPPER BACK PAIN: ICD-10-CM

## 2025-05-27 DIAGNOSIS — M54.2 NECK PAIN: ICD-10-CM

## 2025-05-27 DIAGNOSIS — G43.009 MIGRAINE WITHOUT AURA AND WITHOUT STATUS MIGRAINOSUS, NOT INTRACTABLE: Primary | ICD-10-CM

## 2025-05-27 PROCEDURE — 97140 MANUAL THERAPY 1/> REGIONS: CPT | Mod: GP | Performed by: PHYSICAL THERAPIST

## 2025-05-27 PROCEDURE — 97110 THERAPEUTIC EXERCISES: CPT | Mod: GP | Performed by: PHYSICAL THERAPIST

## 2025-05-27 PROCEDURE — 97014 ELECTRIC STIMULATION THERAPY: CPT | Mod: GP | Performed by: PHYSICAL THERAPIST

## 2025-05-28 ENCOUNTER — OFFICE VISIT (OUTPATIENT)
Dept: OPHTHALMOLOGY | Facility: CLINIC | Age: 35
End: 2025-05-28
Attending: OPTOMETRIST
Payer: COMMERCIAL

## 2025-05-28 DIAGNOSIS — H53.10 SUBJECTIVE VISUAL DISTURBANCE: ICD-10-CM

## 2025-05-28 DIAGNOSIS — H53.2 DOUBLE VISION: Primary | ICD-10-CM

## 2025-05-28 DIAGNOSIS — H51.11 CONVERGENCE INSUFFICIENCY: ICD-10-CM

## 2025-05-28 DIAGNOSIS — H53.30 DISORDER OF BINOCULAR VISION: ICD-10-CM

## 2025-05-28 PROCEDURE — G0463 HOSPITAL OUTPT CLINIC VISIT: HCPCS | Performed by: OPHTHALMOLOGY

## 2025-05-28 PROCEDURE — 99204 OFFICE O/P NEW MOD 45 MIN: CPT | Mod: GC | Performed by: OPHTHALMOLOGY

## 2025-05-28 PROCEDURE — 92060 SENSORIMOTOR EXAMINATION: CPT | Performed by: OPHTHALMOLOGY

## 2025-05-28 RX ORDER — MAGNESIUM GLYCINATE 100 MG
CAPSULE ORAL
COMMUNITY
Start: 2024-05-01

## 2025-05-28 ASSESSMENT — REFRACTION_WEARINGRX
OS_AXIS: 015
OD_AXIS: 150
OD_HPRISM: 0.5
OS_HBASE: BASE IN
OD_HBASE: BASE IN
OS_HBASE: BASE IN
OS_HPRISM: 2.0
OS_SPHERE: +0.25
OS_CYLINDER: +0.50
OS_HPRISM: 0.5
OD_HBASE: BASE IN
SPECS_TYPE: DISTANCE
OD_CYLINDER: +0.25
OD_HPRISM: 2.0
OD_SPHERE: PLANO
OD_AXIS: 160
OD_SPHERE: -0.25
OS_AXIS: 030
SPECS_TYPE: NEAR
OS_CYLINDER: +0.50
OD_CYLINDER: +0.25
OS_SPHERE: -0.50

## 2025-05-28 ASSESSMENT — VISUAL ACUITY
METHOD: SNELLEN - LINEAR
OS_SC: 20/20
OD_SC: 20/20

## 2025-05-28 ASSESSMENT — TONOMETRY
IOP_METHOD: ICARE
OD_IOP_MMHG: 19
OS_IOP_MMHG: 18

## 2025-05-28 ASSESSMENT — CONF VISUAL FIELD
OD_NORMAL: 1
OS_NORMAL: 1
OS_INFERIOR_TEMPORAL_RESTRICTION: 0
METHOD: COUNTING FINGERS
OD_INFERIOR_TEMPORAL_RESTRICTION: 0
OS_INFERIOR_NASAL_RESTRICTION: 0
OS_SUPERIOR_NASAL_RESTRICTION: 0
OS_SUPERIOR_TEMPORAL_RESTRICTION: 0
OD_SUPERIOR_TEMPORAL_RESTRICTION: 0
OD_SUPERIOR_NASAL_RESTRICTION: 0
OD_INFERIOR_NASAL_RESTRICTION: 0

## 2025-05-28 ASSESSMENT — SLIT LAMP EXAM - LIDS: COMMENTS: NORMAL

## 2025-05-28 ASSESSMENT — CUP TO DISC RATIO
OS_RATIO: 0.15
OD_RATIO: 0.0

## 2025-05-28 ASSESSMENT — EXTERNAL EXAM - LEFT EYE: OS_EXAM: NORMAL

## 2025-05-28 ASSESSMENT — MARGIN REFLEX DISTANCE
OS_MRD1: 1.5
OD_MRD1: 3

## 2025-05-28 ASSESSMENT — EXTERNAL EXAM - RIGHT EYE: OD_EXAM: NORMAL

## 2025-05-28 NOTE — PROGRESS NOTES
1. Migraines with out aura -will be starting amitriptyline soon, continue using ibuprofen as needed  2. 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  She may consider seeing a pain specialist.  3. Possible convergence insufficiency   4. 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  Her left eye pain is not exclusively present when reading or performing near work as expected in convergence insufficiency. Her strabismus is nearly completely corrected with her current 2 pairs of ground in prism glasses and yet she has severe left eye pain still.  I do not believe her eye pain is directly related to convergence insufficiency nor strabismus.  Her pain far exceeds the expected asthenopia like eye strain of a patient struggling to fuse.  There is no harm in continuing to wear her ground in prism glasses but I don't believe this is the principal cause of her eye pain.    No follow-up indicated with neuro-ophthalmology. Work with neurology on migraine and neuropathic pain management. Patient followed with Dr. Mendes in the past who also attributed her eye pain to migraine spectrum disorder but at that time patient did not wish to take a prophylactic migraine medication.    Lynnette Vicente is a pleasant 34 year old White female who presents to my neuro-ophthalmology clinic today having been referred by Dr. Abraham for disorder of binocular vision and pain in the LEFT eye    HPI:    Patient went in for eye pain in LEFT eye in fall of 2022, without any history of trauma, this was constant daily pain, debilitating, needed to close her eyes. This pain got worse when looking at the computer, driving  and reading.     She was given prism glasses at [facility] and then referred to Dr. Abraham.  Her pain got better with her current prism glasses but did not resolve (has 1 pair for distance and 1 pair for near, 1 and 4 base in respectively.) These eye pain are constant, /.  Her current eye pain has lasted 36 hours.     Overall, Mary Anne feels the pain in the left eye has improved somewhat with the prisms since 2022.     The pain gets worse with eye movement, pain is about 3-5/10, painkillers don't help. Going to a dark place and closing her eyes don't help.     She had migraines since puberty, these headaches improve with ibuprofen. The migraines are different from this pain.      She is constantly light sensitive.  She thinks maybe her photophobia is worse when in the midst of a bad left eye pain cycle.     Patient reports that pain episodes can come on for days for instance she has had pain for the past 36 hours.  This is not predicated on constant near vision and occurs even in the absence of prolonged periods of reading or computer work.  No associated ocular injection or periocular swelling at the time of eye pain.    She has double vision at close, sometimes feels distance objects want to split, she is not sure if this goes away from closing either eye.     She also started to get floaters in both eyes from the last couple of years.  These have slowly gotten worse    6/10/2024 -TSH 2.09, ESR 6, CRP < 3    Ocular history: Ptosis - left eye since childhood, focusing problem from distance to far.   Drops: None     Independent historians:  Patient    Review of outside testin/2023 MRI Brain   IMPRESSION:  No specific MR finding to account for symptoms.    My interpretation performed today of outside testing:  I have independently reviewed MRI performed 2023.  I agree with radiology interpretation of a normal MRI.  Specifically I reviewed the dedicated orbital sequences which demonstrated  no pathology that would be associated with left or right eye pain.    Review of outside clinical notes:    3/13/2025 -- Visit with Dr. Jarad Mendes [facility]rology [facility] 03/2024  Assessment:  Eye pain (L>R), unclear etiology  Migraine w/out aura     The patient's eye pain persists.  She has no findings on MRI which would indicate an etiology, and it doesn't appear her optometrist feels there is optic neuritis given repeat evaluations.  Overall, she may benefit from a pain clinic referral through her PCP for ongoing management of her eye pain, as there are signs/reports of supra-trochlear nerve irritation along with it (Potential for blocks to be done to help quell the pain).   Regarding her migraines, the patient is hesitant to start a daily prophylactic and didn't respond well to triptans (chest tightness). If she would like to trial a daily medication, then she could return to clinic to do so and possible consider CGRP antagonist therapies if those trials fail.     Plan:  PCP to consider pain clinic referral  Patient is welcome to return to clinic for management of migraines (not thought to be relate to eye pain)     Follow up in Neurology clinic as needed should new concerns arise.    Past medical history:    Patient Active Problem List   Diagnosis    Hemorrhage of rectum and anus    Migraine without aura and without status migrainosus, not intractable    IUD (intrauterine device) in place    Ligament laxity    Grief reaction    Current mild episode of major depressive disorder without prior episode    Family history of cardiomyopathy    Mitral valve prolapse    Chronic pain of both knees    Upper back pain    Neck pain    Moderate recurrent major depression (H)   .   Patients medication history:   Lexapro  Magnesium citrate    Family history / social history:  Patient's family history includes Cardiomyopathy in her brother and mother; Colon Cancer (age of onset: 60) in her  mother; Diabetes Type 2  in her father; Heart Failure in her mother; Hydrocephalus in her mother; Hypertension in her mother; Skin Cancer in her mother; Ulcerative Colitis in her maternal grandfather.     Patient  reports that she has never smoked. She has never been exposed to tobacco smoke. She has never used smokeless tobacco. She reports current alcohol use. She reports that she does not use drugs.     Job: Nonprofit organization     Exam:  Visual acuity 20/20 right eye 20/20 left eye.  Color vision is full in both eyes.  Pupils: no APD, round and reactive.  Intraocular pressure 19 right eye and 18 left eye.  Anterior segment exam unremarkalbe.  Fundus exam unremarkable.     Sensorimotor examination performed today showed full motility in both eyes.  Smooth pursuit was normal.  Saccades were normal.  There was no pathologic nystagmus.  Alternate cover testing at distance in all gaze directions revealed a 1 prism diopter exophoria.  At near she had an intermittent exotropia with excellent control of 4 prism diopters.  She had mildly reduced convergence fusional amplitudes at 10 prism diopters at near.    There is mild left upper eyelid ptosis with MRD 1 measurements of 3 mm on the right and 1.5 mm on the left.    On orbital palpation she described to me moderate tenderness to palpation over the inferonasal orbital rim on the left side.  She previously had reported some left superonasal orbital tenderness but on my check this was not tender.    Cranial nerves V1-3, 7,8,9,11, and 12 were normal bilaterally.        Venkata Gasca MD   Fellow, Neuro-Ophthalmology    45 minutes were spent on the date of the encounter by me doing chart review, history and exam, documentation, and further activities as noted above    Complete documentation of historical and exam elements from today's encounter can be found in the full encounter summary report (not reduplicated in this progress note).  I personally obtained the  chief complaint(s) and history of present illness.  I confirmed and edited as necessary the review of systems, past medical/surgical history, family history, social history, and examination findings as documented by others; and I examined the patient myself.  I personally reviewed the relevant tests, images, and reports as documented above.  I formulated and edited as necessary the assessment and plan and discussed the findings and management plan with the patient and family.  I personally reviewed the ophthalmic test(s) associated with this encounter, agree with the interpretation(s) as documented by the resident/fellow, and have edited the corresponding report(s) as necessary.     Amor Pérez MD

## 2025-05-28 NOTE — LETTER
May 28, 2025    RE: Lynnette Vicente  : 1990  MRN: [id number]    Dear Dr. Abraham    Thank you for referring your patient, Lynnette Vicente, to my neuro-ophthalmology clinic recently.  After a thorough neuro-ophthalmic history and examination, I came to the following conclusions:     1. Migraines with out aura -will be starting amitriptyline soon, continue using ibuprofen as needed  2. 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  She may consider seeing a pain specialist.  3. Possible convergence insufficiency   4. 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  Her left eye pain is not exclusively present when reading or performing near work as expected in convergence insufficiency. Her strabismus is nearly completely corrected with her current 2 pairs of ground in prism glasses and yet she has severe left eye pain still.  I do not believe her eye pain is directly related to convergence insufficiency nor strabismus.  Her pain far exceeds the expected asthenopia like eye strain of a patient struggling to fuse.  There is no harm in continuing to wear her ground in prism glasses but I don't believe this is the principal cause of her eye pain.    No follow-up indicated with neuro-ophthalmology. Work with neurology on migraine and neuropathic pain management. Patient followed with Dr. Mendes in the past who also attributed her eye pain to migraine spectrum disorder but at that time patient did not wish to take a prophylactic migraine medication.    Lynnette Vicente is a pleasant 34 year old White female who presents to my neuro-ophthalmology clinic today having been referred by Dr. Abraham  for disorder of binocular vision and pain in the LEFT eye    HPI:    Patient went in for eye pain in LEFT eye in , without any history of trauma, this was constant daily pain, debilitating, needed to close her eyes. This pain got worse when looking at the computer, driving and reading.       [facility] and [facility] Dr. Abraham.  Her pain got better with her current prism glasses but did not resolve (has 1 pair for distance and 1 pair for near, 1 and 4 base in respectively.) These eye pain are constant, 24/7.  Her current eye pain has lasted 36 hours.     Overall, Mary Anne feels the pain in the left eye has improved somewhat with the prisms since 2022.     The pain gets worse with eye movement, pain is about 3-5/10, painkillers don't help. Going to a dark place and closing her eyes don't help.     She had migraines since puberty, these headaches improve with ibuprofen. The migraines are different from this pain.      She is constantly light sensitive.  She thinks maybe her photophobia is worse when in the midst of a bad left eye pain cycle.     Patient reports that pain episodes can come on for days for instance she has had pain for the past 36 hours.  This is not predicated on constant near vision and occurs even in the absence of prolonged periods of reading or computer work.  No associated ocular injection or periocular swelling at the time of eye pain.    She has double vision at close, sometimes feels distance objects want to split, she is not sure if this goes away from closing either eye.     She also started to get floaters in both eyes from the last couple of years.  These have slowly gotten worse    6/10/2024 -TSH 2.09, ESR 6, CRP < 3    Ocular history: Ptosis - left eye since childhood, focusing problem from distance to far.   Drops: None     Independent historians:  Patient    Review of outside testin/2023 MRI Brain   IMPRESSION:  No  specific MR finding to account for symptoms.    My interpretation performed today of outside testing:  I have independently reviewed MRI performed February 2023.  I agree with radiology interpretation of a normal MRI.  Specifically I reviewed the dedicated orbital sequences which demonstrated no pathology that would be associated with left or right eye pain.    Review of outside clinical notes:    3/13/2025 -- Visit with Dr. Jarad Mendes Neurology [facility] 03/2024  Assessment:  Eye pain (L>R), unclear etiology  Migraine w/out aura     The patient's eye pain persists.  She has no findings on MRI which would indicate an etiology, and it doesn't appear her optometrist feels there is optic neuritis given repeat evaluations.  Overall, she may benefit from a pain clinic referral through her PCP for ongoing management of her eye pain, as there are signs/reports of supra-trochlear nerve irritation along with it (Potential for blocks to be done to help quell the pain).   Regarding her migraines, the patient is hesitant to start a daily prophylactic and didn't respond well to triptans (chest tightness). If she would like to trial a daily medication, then she could return to clinic to do so and possible consider CGRP antagonist therapies if those trials fail.             Plan:  PCP to consider pain clinic referral  Patient is welcome to return to clinic for management of migraines (not thought to be relate to eye pain)     Follow up in Neurology clinic as needed should new concerns arise.    Past medical history:    Patient Active Problem List   Diagnosis    Hemorrhage of rectum and anus    Migraine without aura and without status migrainosus, not intractable    IUD (intrauterine device) in place    Ligament laxity    Grief reaction    Current mild episode of major depressive disorder without prior episode    Family history of cardiomyopathy    Mitral valve prolapse    Chronic pain of both  knees    Upper back pain    Neck pain    Moderate recurrent major depression (H)   .   Patients medication history:   Lexapro  Magnesium citrate    Family history / social history:  Patient's family history includes Cardiomyopathy in her brother and mother; Colon Cancer (age of onset: 60) in her mother; Diabetes Type 2  in her father; Heart Failure in her mother; Hydrocephalus in her mother; Hypertension in her mother; Skin Cancer in her mother; Ulcerative Colitis in her maternal grandfather.     Patient  reports that she has never smoked. She has never been exposed to tobacco smoke. She has never used smokeless tobacco. She reports current alcohol use. She reports that she does not use drugs.     Job: Nonprofit organization     Exam:  Visual acuity 20/20 right eye 20/20 left eye.  Color vision is full in both eyes.  Pupils: no APD, round and reactive.  Intraocular pressure 19 right eye and 18 left eye.  Anterior segment exam unremarkalbe.  Fundus exam unremarkable.     Sensorimotor examination performed today showed full motility in both eyes.  Smooth pursuit was normal.  Saccades were normal.  There was no pathologic nystagmus.  Alternate cover testing at distance in all gaze directions revealed a 1 prism diopter exophoria.  At near she had an intermittent exotropia with excellent control of 4 prism diopters.  She had mildly reduced convergence fusional amplitudes at 10 prism diopters at near.    There is mild left upper eyelid ptosis with MRD 1 measurements of 3 mm on the right and 1.5 mm on the left.      On orbital palpation she described to me moderate tenderness to palpation over the inferonasal orbital rim on the left side.  She previously had reported some left superonasal orbital tenderness but on my check this was not tender.    Cranial nerves V1-3, 7,8,9,11, and 12 were normal bilaterally.     Again, thank you for trusting me with the care of your patient.  For further exam details, please feel free to  contact our office for additional records.  If you wish to contact me regarding this patient please email me at [email or web address] or give my clinic a call to arrange a phone conversation.    Sincerely,    Amor Pérez MD  , Neuro-Ophthalmology and Adult Strabismus Surgery  [facility] in Neuro-Ophthalmology  Department of Ophthalmology and Visual Neurosciences  [facility]    DX: neuropathic eye pain

## 2025-06-07 DIAGNOSIS — G43.009 MIGRAINE WITHOUT AURA AND WITHOUT STATUS MIGRAINOSUS, NOT INTRACTABLE: ICD-10-CM

## 2025-06-10 RX ORDER — AMITRIPTYLINE HYDROCHLORIDE 10 MG/1
10 TABLET ORAL AT BEDTIME
Qty: 90 TABLET | Refills: 2 | Status: SHIPPED | OUTPATIENT
Start: 2025-06-10

## 2025-06-12 ENCOUNTER — THERAPY VISIT (OUTPATIENT)
Age: 35
End: 2025-06-12

## 2025-06-12 DIAGNOSIS — M54.9 UPPER BACK PAIN: ICD-10-CM

## 2025-06-12 DIAGNOSIS — M54.2 NECK PAIN: ICD-10-CM

## 2025-06-12 DIAGNOSIS — G43.009 MIGRAINE WITHOUT AURA AND WITHOUT STATUS MIGRAINOSUS, NOT INTRACTABLE: Primary | ICD-10-CM

## 2025-06-12 NOTE — PROGRESS NOTES
[facility]                                                                                   OUTPATIENT PHYSICAL THERAPY    PLAN OF TREATMENT FOR OUTPATIENT REHABILITATION   Patient's Last Name, First Name, Lynnette Lopez YOB: 1990   Provider's Name   [facility]   Medical Record No.  [id number]     Onset Date: 01/24/25  Start of Care Date: 02/27/25     Medical Diagnosis:  Neck and upper back pain      PT Treatment Diagnosis:  Neck and upper back pain Plan of Treatment  Frequency/Duration: 1x/wk/ 3 months    Certification date from 05/28/25 to 08/25/25         See note for plan of treatment details and functional goals     Ghazal Mireles, PT                         I CERTIFY THE NEED FOR THESE SERVICES FURNISHED UNDER        THIS PLAN OF TREATMENT AND WHILE UNDER MY CARE     (Physician attestation of this document indicates review and certification of the therapy plan).              Referring Provider:  Ciro Hutchinson    Initial Assessment  See Epic Evaluation- Start of Care Date: 02/27/25            PLAN  Continue therapy per current plan of care.    Beginning/End Dates of Progress Note Reporting Period:  02/27/25 to 06/12/2025    Referring Provider:  Ciro Hutchinson     06/12/25 0500   Appointment Info   Signing clinician's name / credentials joan Erickson   Total/Authorized Visits 12   Visits Used 5   Medical Diagnosis Neck and upper back pain   PT Tx Diagnosis Neck and upper back pain   Other pertinent information Has regular migraines and Left sided eye pain   Progress Note/Certification   Start of Care Date 02/27/25   Onset of illness/injury or Date of Surgery 01/24/25   Therapy Frequency 1x/wk   Predicted Duration 3 months   Certification date from 05/28/25   Certification date to 08/25/25   Progress Note Due Date 08/10/25   Progress Note Completed Date 02/27/25   GOALS   PT Goals 2;3   PT Goal 1   Goal  Identifier LTG   Goal Description Patient is able to complete a full work day with pain<5/10 at least 3-4 days/wk   Rationale   (to allow patient to start and keep her new job)   Goal Progress currently averaging 6/10 pain at end of day   Target Date 08/25/25   PT Goal 2   Goal Identifier LTG   Goal Description be able to climb up/down stairs w/ minimal bilateral knee pain (3/10 or less)   Rationale to maximize safety and independence within the home;to maximize safety and independence within the community   Target Date 01/19/25   Date Met 01/14/25   PT Goal 3   Goal Identifier LTG   Goal Description Be able to negotiate stairs painfree   Rationale to maximize safety and independence with performance of ADLs and functional tasks   Goal Progress Still gets pain randomly with stairs, but it is more mild.  Hopes returning to more strengthening at the gym will help   Target Date 08/25/25   Subjective Report   Subjective Report Seen a neurooptomotrist and they confirmed that she may have fibromyalgia or some other nerve disorder.  They think the fact her pain is in the eye is random.  Or maybe Emre Knapp.  Has also started back at the gym and doing strength training 3x/wk.  The l sided neck pain is better, but still slightly elevated since her one bike ride.  The exercises def help.  Can't do theracane as it hurts her hands too much.  Ends her work day with average neck pain of 6/10 currently   Objective Measures   Objective Measures Objective Measure 1;Objective Measure 2;Objective Measure 3;Objective Measure 4;Objective Measure 5;Objective Measure 6   Objective Measure 1   Objective Measure Posture   Details good when reminded, but otherwise demonstartes rounded shoulders and forward head   Objective Measure 2   Objective Measure Palpation   Details TTP B scalenes, UT, levator, sub occipitals and erector spinae   Objective Measure 3   Objective Measure Strength   Details B UE and cervical myotomes all grossly 5/5.   "Neck flexor and extensor endurance test at 15 seconds currently   Objective Measure 4   Objective Measure single leg squat   Details feeling of instability/shifting of patella around 45 degrees now on L, not R   Objective Measure 5   Objective Measure Palpation   Details TTP edyta patellar tendon and inferior patella, L medial/lateral/superior patella   Objective Measure 6   Objective Measure Lumbar ROM   Details flex to toes, ext WNL+, B SG WNL   PT Modalities   PT Modalities Electrical Stimulation   Treatment Interventions (PT)   Interventions Manual Therapy;Neuromuscular Re-education   Therapeutic Procedure/Exercise   Therapeutic Procedures: strength, endurance, ROM, flexibility minutes (57961) 10   Therapeutic Procedures Ther Proc 2;Ther Proc 3;Ther Proc 4;Ther Proc 5;Ther Proc 6   Ther Proc 1 Use of foam roller and theracane on upper back and neck   Ther Proc 1 - Details reviewed theracane use and taught her how to use it better without pressure/gripping so much with her hands.   Ther Proc 2 Self SOR using tennis balls   Ther Proc 2 - Details reviewed   Ther Proc 3 cerv ret/ext   Ther Proc 3 - Details x5 and will try to do this hourly   Ther Proc 4 sclane stretch   Ther Proc 4 - Details gets a good stretch   Ther Proc 5 standing quad stretch using table   Ther Proc 5 - Details hep   Ther Proc 6 SNAG   Ther Proc 6 - Details NT   PTRx Ther Proc 1 Lateral Step Down   PTRx Ther Proc 1 - Details 3\"  Not today   PTRx Ther Proc 2 Bridging #2b   PTRx Ther Proc 2 - Details hep   PTRx Ther Proc 3 Neck flexor and extensor strengthning   PTRx Ther Proc 3 - Details 15\"x3 each   Skilled Intervention verbal and visual cueing provided   Patient Response/Progress tolerated shorter step better for R leg w/ decreased feeling of instablity   Therapeutic Activity   Therapeutic Activities: dynamic activities to improve functional performance minutes (59780) 10   Ther Act 1 Spent 10' discussing how to combine her new gym class with her " knee ex's.  I want her to do them on the same days and then take a day off after.  Unless she feels the class fatigues her quad good enough, then that can take place of the step down, but will likely still need to do the glut series of ex's.   PTRx Ther Act 1 Foam Roller IT Band   PTRx Ther Act 1 - Details hep   Skilled Intervention educated patient to do leg stuff on same day as gym OR skip it.  I want her to have a day off.   Patient Response/Progress questions answered, understanding expressed, minimal pain along IT band w/ foam roller   Neuromuscular Re-education   Neuromuscular Re-education Neuro Re-ed 2;Neuro Re-ed 3;Neuro Re-ed 4;Neuro Re-ed 5   Neuro Re-ed 1 MET for ant rotated ilium   Neuro Re-ed 1 - Details NT   Neuro Re-ed 2 glut myofascial lower arc   Neuro Re-ed 2 - Details L leg NT   Neuro Re-ed 3 glut myofascial upper arc   Neuro Re-ed 3 - Details L leg NT   Neuro Re-ed 4 glut myofascial full arc   Neuro Re-ed 4 - Details L leg NT   Neuro Re-ed 5 piriformis cruncher   Neuro Re-ed 5 - Details L leg NT   Manual Therapy   Manual Therapy: Mobilization, MFR, MLD, friction massage minutes (69992) 18   Manual Therapy Manual Therapy 2   Manual Therapy 1 sor in supine   Manual Therapy 1 - Details 3'   Manual Therapy 2 STM to B UT, scalene, and levator in prone   Manual Therapy 2 - Details x15   Education   Learner/Method Patient;Listening;Reading;Demonstration;Pictures/Video   Plan   Home program on phone   Updates to plan of care hypermobile edyta patellas, pain at 40 degrees SL squat, IT massage for tight IT band, lateral step downs for quad strengthening   Plan for next session see if she's been able to do the neck strengthening and how her form is.  Also review step down and glut work   Total Session Time   Timed Code Treatment Minutes 38   Total Treatment Time (sum of timed and untimed services) 38

## 2025-07-17 ENCOUNTER — THERAPY VISIT (OUTPATIENT)
Age: 35
End: 2025-07-17
Payer: COMMERCIAL

## 2025-07-17 DIAGNOSIS — M54.2 NECK PAIN: ICD-10-CM

## 2025-07-17 DIAGNOSIS — G43.009 MIGRAINE WITHOUT AURA AND WITHOUT STATUS MIGRAINOSUS, NOT INTRACTABLE: Primary | ICD-10-CM

## 2025-07-17 DIAGNOSIS — M54.9 UPPER BACK PAIN: ICD-10-CM

## 2025-07-24 ENCOUNTER — THERAPY VISIT (OUTPATIENT)
Age: 35
End: 2025-07-24
Payer: COMMERCIAL

## 2025-07-24 DIAGNOSIS — G43.009 MIGRAINE WITHOUT AURA AND WITHOUT STATUS MIGRAINOSUS, NOT INTRACTABLE: Primary | ICD-10-CM

## 2025-07-24 DIAGNOSIS — M54.2 NECK PAIN: ICD-10-CM

## 2025-07-24 DIAGNOSIS — M54.9 UPPER BACK PAIN: ICD-10-CM

## 2025-07-24 NOTE — PROGRESS NOTES
PLAN  Continue therapy per current plan of care, but will try on her own for 1-2 months to see how it goes.    Beginning/End Dates of Progress Note Reporting Period:  06/12/25 to 07/24/2025    Referring Provider:  Ciro Hutchinson     07/24/25 0500   Appointment Info   Signing clinician's name / credentials joan Erickson   Total/Authorized Visits 12   Visits Used 7   Medical Diagnosis Neck and upper back pain   PT Tx Diagnosis Neck and upper back pain   Other pertinent information Has regular migraines and Left sided eye pain   Progress Note/Certification   Start of Care Date 02/27/25   Onset of illness/injury or Date of Surgery 01/24/25   Therapy Frequency 1x/wk   Predicted Duration 3 months   Certification date from 05/28/25   Certification date to 08/25/25   Progress Note Due Date 08/10/25   Progress Note Completed Date 06/12/25   GOALS   PT Goals 2;3   PT Goal 1   Goal Identifier LTG   Goal Description Patient is able to complete a full work day with pain<5/10 at least 3-4 days/wk   Rationale   (to allow patient to start and keep her new job)   Goal Progress currently averaging 6/10 pain at end of day   Target Date 08/25/25   PT Goal 2   Goal Identifier LTG   Goal Description be able to climb up/down stairs w/ minimal bilateral knee pain (3/10 or less)   Rationale to maximize safety and independence within the home;to maximize safety and independence within the community   Target Date 01/19/25   Date Met 01/14/25   PT Goal 3   Goal Identifier LTG   Goal Description Be able to negotiate stairs painfree   Rationale to maximize safety and independence with performance of ADLs and functional tasks   Goal Progress Still gets pain randomly with stairs, but it is more mild.  Hopes returning to more strengthening at the gym will help   Target Date 08/25/25   Subjective Report   Subjective Report Patient reports she is ready to try doing her PT at home independently and seeing if she keeps it up and is able to  manage her pain.  Currently is doing her HEP and gym class workouts.  Her HAs and neck pain are better than they use to be.  She doesn't get as many HAs and the SOR really help when she does.  The Eye pain is more problematic than the neck and HAs.  Still has knee pain with stairs.   Objective Measures   Objective Measures Objective Measure 1;Objective Measure 2;Objective Measure 3;Objective Measure 4;Objective Measure 5;Objective Measure 6   Objective Measure 1   Objective Measure Posture   Details good when reminded, but otherwise demonstartes rounded shoulders and forward head   Objective Measure 2   Objective Measure Palpation   Details TTP B scalenes, UT, levator, sub occipitals and erector spinae   Objective Measure 3   Objective Measure Strength   Details B UE and cervical myotomes all grossly 5/5.  Neck flexor and extensor endurance test at 40 seconds currently.   Knee ext 5-R 5L, Flex 5-R, 5L   Objective Measure 4   Objective Measure single leg squat   Details at 85R and 90 on the L   Objective Measure 5   Objective Measure Palpation   Details TTP edyta patellar tendon and inferior patella, L medial/lateral/superior patella   Objective Measure 6   Objective Measure Lumbar ROM   Details flex to toes, ext WNL+, B SG WNL   PT Modalities   PT Modalities Electrical Stimulation   Treatment Interventions (PT)   Interventions Manual Therapy;Neuromuscular Re-education   Therapeutic Procedure/Exercise   Therapeutic Procedures: strength, endurance, ROM, flexibility minutes (65549) 4   Therapeutic Procedures Ther Proc 2;Ther Proc 3;Ther Proc 4;Ther Proc 5;Ther Proc 6;Ther Proc 7;Ther Proc 8   Ther Proc 1 Use of foam roller and theracane on upper back and neck   Ther Proc 1 - Details reviewed ways to use cane on pecs instead of foam roller now   Ther Proc 2 Self SOR using tennis balls   Ther Proc 2 - Details reviewed   Ther Proc 3 cerv ret/ext   Ther Proc 3 - Details reviewed and will now try to set up an hourly remidner  "on her computer at work   Ther Proc 4 sclane stretch   Ther Proc 4 - Details gets a good stretch   Ther Proc 5 standing quad stretch using table   Ther Proc 5 - Details hep   Ther Proc 6 SNAG   Ther Proc 6 - Details no longer needed   Ther Proc 7 LAQ   Ther Proc 7 - Details 20# reviewed   Ther Proc 8 Prone hamstring curl   Ther Proc 8 - Details 20# reviewed   PTRx Ther Proc 1 Lateral Step Down   PTRx Ther Proc 1 - Details 3\"  Not today   PTRx Ther Proc 2 Bridging #2b   PTRx Ther Proc 2 - Details hep   PTRx Ther Proc 3 Neck flexor and extensor strengthning   PTRx Ther Proc 3 - Details reviewed and discussed ways to incorporate this into her gym class possibly   Skilled Intervention verbal and visual cueing provided   Patient Response/Progress tolerated shorter step better for R leg w/ decreased feeling of instablity   Therapeutic Activity   Therapeutic Activities: dynamic activities to improve functional performance minutes (16464) 8   Ther Act 1 Spent 8' discussing plan for home on her own and what helps the most when she is in pain   PTRx Ther Act 1 Foam Roller IT Band   PTRx Ther Act 1 - Details hep   Skilled Intervention educated patient to do leg stuff on same day as gym OR skip it.  I want her to have a day off.   Patient Response/Progress questions answered, understanding expressed, minimal pain along IT band w/ foam roller   Neuromuscular Re-education   Neuromuscular Re-education Neuro Re-ed 2;Neuro Re-ed 3;Neuro Re-ed 4;Neuro Re-ed 5   Neuro Re-ed 1 MET for ant rotated ilium   Neuro Re-ed 1 - Details NT   Neuro Re-ed 2 glut myofascial lower arc   Neuro Re-ed 2 - Details L leg NT   Neuro Re-ed 3 glut myofascial upper arc   Neuro Re-ed 3 - Details L leg NT   Neuro Re-ed 4 glut myofascial full arc   Neuro Re-ed 4 - Details L leg NT   Neuro Re-ed 5 piriformis cruncher   Neuro Re-ed 5 - Details L leg NT   Manual Therapy   Manual Therapy: Mobilization, MFR, MLD, friction massage minutes (16528) 18   Manual Therapy " Manual Therapy 2   Manual Therapy 1 sor in supine   Manual Therapy 1 - Details 3'   Manual Therapy 2 STM to B UT, scalene, and levator in prone and supine   Manual Therapy 2 - Details x15'   Education   Learner/Method Patient;Listening;Reading;Demonstration;Pictures/Video   Plan   Home program on phone   Updates to plan of care hypermobile edyta patellas, pain at 40 degrees SL squat, IT massage for tight IT band, lateral step downs for quad strengthening   Plan for next session see if she's been able to do the neck strengthening and how her form is.  Also review step down and glut work   Total Session Time   Timed Code Treatment Minutes 30   Total Treatment Time (sum of timed and untimed services) 30

## 2025-07-29 ENCOUNTER — VIRTUAL VISIT (OUTPATIENT)
Dept: FAMILY MEDICINE | Facility: CLINIC | Age: 35
End: 2025-07-29
Payer: COMMERCIAL

## 2025-07-29 DIAGNOSIS — G43.009 MIGRAINE WITHOUT AURA AND WITHOUT STATUS MIGRAINOSUS, NOT INTRACTABLE: Primary | ICD-10-CM

## 2025-07-29 DIAGNOSIS — H57.02 ANISOCORIA: ICD-10-CM

## 2025-07-29 DIAGNOSIS — H57.10 PAIN IN EYE, UNSPECIFIED LATERALITY: ICD-10-CM

## 2025-07-29 PROCEDURE — 98006 SYNCH AUDIO-VIDEO EST MOD 30: CPT | Performed by: FAMILY MEDICINE

## 2025-07-29 ASSESSMENT — PATIENT HEALTH QUESTIONNAIRE - PHQ9
10. IF YOU CHECKED OFF ANY PROBLEMS, HOW DIFFICULT HAVE THESE PROBLEMS MADE IT FOR YOU TO DO YOUR WORK, TAKE CARE OF THINGS AT HOME, OR GET ALONG WITH OTHER PEOPLE: SOMEWHAT DIFFICULT
SUM OF ALL RESPONSES TO PHQ QUESTIONS 1-9: 9
SUM OF ALL RESPONSES TO PHQ QUESTIONS 1-9: 9

## 2025-07-29 ASSESSMENT — ANXIETY QUESTIONNAIRES
3. WORRYING TOO MUCH ABOUT DIFFERENT THINGS: NEARLY EVERY DAY
5. BEING SO RESTLESS THAT IT IS HARD TO SIT STILL: NOT AT ALL
GAD7 TOTAL SCORE: 9
GAD7 TOTAL SCORE: 9
6. BECOMING EASILY ANNOYED OR IRRITABLE: NOT AT ALL
4. TROUBLE RELAXING: SEVERAL DAYS
2. NOT BEING ABLE TO STOP OR CONTROL WORRYING: NEARLY EVERY DAY
7. FEELING AFRAID AS IF SOMETHING AWFUL MIGHT HAPPEN: SEVERAL DAYS
7. FEELING AFRAID AS IF SOMETHING AWFUL MIGHT HAPPEN: SEVERAL DAYS
8. IF YOU CHECKED OFF ANY PROBLEMS, HOW DIFFICULT HAVE THESE MADE IT FOR YOU TO DO YOUR WORK, TAKE CARE OF THINGS AT HOME, OR GET ALONG WITH OTHER PEOPLE?: SOMEWHAT DIFFICULT
1. FEELING NERVOUS, ANXIOUS, OR ON EDGE: SEVERAL DAYS
GAD7 TOTAL SCORE: 9
IF YOU CHECKED OFF ANY PROBLEMS ON THIS QUESTIONNAIRE, HOW DIFFICULT HAVE THESE PROBLEMS MADE IT FOR YOU TO DO YOUR WORK, TAKE CARE OF THINGS AT HOME, OR GET ALONG WITH OTHER PEOPLE: SOMEWHAT DIFFICULT

## 2025-07-29 ASSESSMENT — PAIN SCALES - PAIN ENJOYMENT GENERAL ACTIVITY SCALE (PEG)
PEG_TOTALSCORE: 5.67
AVG_PAIN_PASTWEEK: 7
AVG_PAIN_PASTWEEK: 7
INTERFERED_GENERAL_ACTIVITY: 2
INTERFERED_ENJOYMENT_LIFE: 8
INTERFERED_ENJOYMENT_LIFE: 8
INTERFERED_GENERAL_ACTIVITY: 2
PEG_TOTALSCORE: 5.67

## 2025-07-29 NOTE — PROGRESS NOTES
"Mary Anne is a 34 year old who is being evaluated via a billable video visit.    How would you like to obtain your AVS? MyChart  If the video visit is dropped, the invitation should be resent by: Send to e-mail at: [email or web address]  Will anyone else be joining your video visit? No      Assessment & Plan     Migraine without aura and without status migrainosus, not intractable  Currently on amitriptyline 10mg at bedtime and helping with less migraines but significant side effect of fatigue.   Will try to move dose to earlier in evening - around dinner and see if that is helpful  She also started exercising regularly around time symptoms started to improve so unclear what impact med vs exercise is having on decrease in headaches.  Could trial going off for a while if needed.  Other thought to try in future would be gabapentin at bedtime  - see below      Pain in eye, unspecified laterality  Pt having intermittent eye pain and has been evaluated by neurology and neuro-ophthalmology but no etiology found  Wonder if neuropathic origin -   If pt tolerates the amitriptyline the recommendation may be to go up   If not consider gabapentin.    Anisocoria  Asymmetric pupils she has had historically     BMI  Estimated body mass index is 25.75 kg/m  as calculated from the following:    Height as of 4/11/25: 1.601 m (5' 3.05\").    Weight as of 4/11/25: 66 kg (145 lb 9.6 oz).         Subjective   Mary Anne is a 34 year old, presenting for the following health issues:  No chief complaint on file.      7/29/2025    11:00 AM   Additional Questions   Roomed by Sailaja GIFFORD   Accompanied by self     History of Present Illness       Mental Health Follow-up:  Patient presents to follow-up on Depression & Anxiety.Patient's depression since last visit has been:  No change  The patient is not having other symptoms associated with depression.  Patient's anxiety since last visit has been:  No change  The patient is not having other symptoms " "associated with anxiety.  Any significant life events: health concerns  Patient is feeling anxious or having panic attacks.  Patient has no concerns about alcohol or drug use.    Headaches:   Since the patient's last clinic visit, headaches are: improved  The patient is getting headaches:  Once a week or less  but still feeling eye pain daily and seeing specialist for eye, thinking eye pain may be related to headaches but pt thinks is different. Notices right eye pupil is larger then left pupil 3-4x few months  She is able to do normal daily activities when she has a migraine.  The patient is taking the following rescue/relief medications:  Ibuprofen (Advil, Motrin)   Patient states \"I get total relief\" from the rescue/relief medications.   The patient is taking the following medications to prevent migraines:  Amitriptyline  In the past 4 weeks, the patient has gone to an Urgent Care or Emergency Room 0 times times due to headaches.         Headaches -   Migraines since puberty  Ibuprofen since start with PT        Mystery eye pain since 2022  Was schedule with neuro-ophthalmologist -   Unclear etiology but not sure what is causing the symptoms    Started amitriptyline and going to gym around the same time  Headaches have been a little better   Has had 1-2 more severe headaches   Some lower level headaches between 0-1 time per week  Eye pain has persisted - difficult to tell if helping    The amitriptyline is making her very tired in the morning -   Throughout the day feels tired  Taking the 10mg amitriptyline at bedtime  Having more vivid nightmares -               Review of Systems  Constitutional, HEENT, cardiovascular, pulmonary, GI, , musculoskeletal, neuro, skin, endocrine and psych systems are negative, except as otherwise noted.      Objective    Vitals - Patient Reported  Weight (Patient Reported): 68 kg (150 lb)  Height (Patient Reported): 157.5 cm (5' 2\")  BMI (Based on Pt Reported Ht/Wt): " 27.44        Physical Exam   GENERAL: alert and no distress  EYES: Eyes grossly normal to inspection.  No discharge or erythema, or obvious scleral/conjunctival abnormalities.  RESP: No audible wheeze, cough, or visible cyanosis.    SKIN: Visible skin clear. No significant rash, abnormal pigmentation or lesions.  NEURO: Cranial nerves grossly intact.  Mentation and speech appropriate for age.  PSYCH: Appropriate affect, tone, and pace of words          Video-Visit Details    Type of service:  Video Visit   Originating Location (pt. Location): Home    Distant Location (provider location):  On-site  Platform used for Video Visit: Nisha  Signed Electronically by: Annalisa Beck DO